# Patient Record
Sex: FEMALE | Race: WHITE | NOT HISPANIC OR LATINO | Employment: FULL TIME | URBAN - METROPOLITAN AREA
[De-identification: names, ages, dates, MRNs, and addresses within clinical notes are randomized per-mention and may not be internally consistent; named-entity substitution may affect disease eponyms.]

---

## 2017-04-26 ENCOUNTER — ALLSCRIPTS OFFICE VISIT (OUTPATIENT)
Dept: OTHER | Facility: OTHER | Age: 47
End: 2017-04-26

## 2017-04-26 DIAGNOSIS — Z12.31 ENCOUNTER FOR SCREENING MAMMOGRAM FOR MALIGNANT NEOPLASM OF BREAST: ICD-10-CM

## 2017-05-03 ENCOUNTER — LAB CONVERSION - ENCOUNTER (OUTPATIENT)
Dept: OTHER | Facility: OTHER | Age: 47
End: 2017-05-03

## 2017-05-03 LAB
ADDITIONAL INFORMATION (HISTORICAL): NORMAL
ADEQUACY: (HISTORICAL): NORMAL
COMMENT (HISTORICAL): NORMAL
CYTOTECHNOLOGIST: (HISTORICAL): NORMAL
HPV MRNA E6/E7 (HISTORICAL): NOT DETECTED
INTERPRETATION (HISTORICAL): NORMAL
LMP (HISTORICAL): NORMAL
PREV. BX: (HISTORICAL): NORMAL
PREV. PAP (HISTORICAL): NORMAL
SOURCE (HISTORICAL): NORMAL

## 2017-05-26 ENCOUNTER — ALLSCRIPTS OFFICE VISIT (OUTPATIENT)
Dept: OTHER | Facility: OTHER | Age: 47
End: 2017-05-26

## 2017-05-30 ENCOUNTER — GENERIC CONVERSION - ENCOUNTER (OUTPATIENT)
Dept: OTHER | Facility: OTHER | Age: 47
End: 2017-05-30

## 2017-06-13 DIAGNOSIS — D64.9 ANEMIA: ICD-10-CM

## 2017-06-28 ENCOUNTER — ALLSCRIPTS OFFICE VISIT (OUTPATIENT)
Dept: OTHER | Facility: OTHER | Age: 47
End: 2017-06-28

## 2017-10-18 ENCOUNTER — GENERIC CONVERSION - ENCOUNTER (OUTPATIENT)
Dept: OTHER | Facility: OTHER | Age: 47
End: 2017-10-18

## 2017-10-18 ENCOUNTER — APPOINTMENT (OUTPATIENT)
Dept: RADIOLOGY | Facility: MEDICAL CENTER | Age: 47
End: 2017-10-18
Payer: COMMERCIAL

## 2017-10-18 DIAGNOSIS — M75.102 TEAR OF LEFT ROTATOR CUFF: ICD-10-CM

## 2017-10-18 DIAGNOSIS — M25.512 PAIN IN LEFT SHOULDER: ICD-10-CM

## 2017-10-18 PROCEDURE — 73030 X-RAY EXAM OF SHOULDER: CPT

## 2017-11-27 ENCOUNTER — ALLSCRIPTS OFFICE VISIT (OUTPATIENT)
Dept: OTHER | Facility: OTHER | Age: 47
End: 2017-11-27

## 2017-11-27 DIAGNOSIS — E61.1 IRON DEFICIENCY: ICD-10-CM

## 2017-11-27 DIAGNOSIS — K91.2 POSTSURGICAL MALABSORPTION, NOT ELSEWHERE CLASSIFIED (CODE): ICD-10-CM

## 2017-11-27 DIAGNOSIS — M25.512 PAIN IN LEFT SHOULDER: ICD-10-CM

## 2017-11-27 DIAGNOSIS — D64.9 ANEMIA: ICD-10-CM

## 2017-11-27 DIAGNOSIS — E53.8 DEFICIENCY OF OTHER SPECIFIED B GROUP VITAMINS (CODE): ICD-10-CM

## 2017-11-29 ENCOUNTER — GENERIC CONVERSION - ENCOUNTER (OUTPATIENT)
Dept: OTHER | Facility: OTHER | Age: 47
End: 2017-11-29

## 2017-11-29 ENCOUNTER — TRANSCRIBE ORDERS (OUTPATIENT)
Dept: ADMINISTRATIVE | Facility: HOSPITAL | Age: 47
End: 2017-11-29

## 2017-11-29 ENCOUNTER — APPOINTMENT (OUTPATIENT)
Dept: LAB | Facility: MEDICAL CENTER | Age: 47
End: 2017-11-29
Payer: COMMERCIAL

## 2017-11-29 DIAGNOSIS — K91.2 POSTSURGICAL MALABSORPTION, NOT ELSEWHERE CLASSIFIED (CODE): ICD-10-CM

## 2017-11-29 DIAGNOSIS — E53.8 DEFICIENCY OF OTHER SPECIFIED B GROUP VITAMINS (CODE): ICD-10-CM

## 2017-11-29 DIAGNOSIS — E61.1 IRON DEFICIENCY: ICD-10-CM

## 2017-11-29 DIAGNOSIS — D64.9 ANEMIA: ICD-10-CM

## 2017-11-29 DIAGNOSIS — M25.512 LEFT SHOULDER PAIN, UNSPECIFIED CHRONICITY: Primary | ICD-10-CM

## 2017-11-29 LAB
ALBUMIN SERPL BCP-MCNC: 3.6 G/DL (ref 3.5–5)
ALP SERPL-CCNC: 41 U/L (ref 46–116)
ALT SERPL W P-5'-P-CCNC: 19 U/L (ref 12–78)
ANION GAP SERPL CALCULATED.3IONS-SCNC: 5 MMOL/L (ref 4–13)
AST SERPL W P-5'-P-CCNC: 16 U/L (ref 5–45)
BILIRUB SERPL-MCNC: 0.51 MG/DL (ref 0.2–1)
BUN SERPL-MCNC: 11 MG/DL (ref 5–25)
CALCIUM SERPL-MCNC: 8.6 MG/DL (ref 8.3–10.1)
CHLORIDE SERPL-SCNC: 104 MMOL/L (ref 100–108)
CHOLEST SERPL-MCNC: 161 MG/DL (ref 50–200)
CO2 SERPL-SCNC: 28 MMOL/L (ref 21–32)
CREAT SERPL-MCNC: 0.65 MG/DL (ref 0.6–1.3)
GFR SERPL CREATININE-BSD FRML MDRD: 105 ML/MIN/1.73SQ M
GLUCOSE P FAST SERPL-MCNC: 77 MG/DL (ref 65–99)
HDLC SERPL-MCNC: 70 MG/DL (ref 40–60)
LDLC SERPL CALC-MCNC: 81 MG/DL (ref 0–100)
POTASSIUM SERPL-SCNC: 4.3 MMOL/L (ref 3.5–5.3)
PROT SERPL-MCNC: 8 G/DL (ref 6.4–8.2)
SODIUM SERPL-SCNC: 137 MMOL/L (ref 136–145)
TRIGL SERPL-MCNC: 51 MG/DL
TSH SERPL DL<=0.05 MIU/L-ACNC: 2.19 UIU/ML (ref 0.36–3.74)
VIT B12 SERPL-MCNC: 94 PG/ML (ref 100–900)

## 2017-11-29 PROCEDURE — 80053 COMPREHEN METABOLIC PANEL: CPT

## 2017-11-29 PROCEDURE — 85045 AUTOMATED RETICULOCYTE COUNT: CPT

## 2017-11-29 PROCEDURE — 36415 COLL VENOUS BLD VENIPUNCTURE: CPT

## 2017-11-29 PROCEDURE — 80061 LIPID PANEL: CPT

## 2017-11-29 PROCEDURE — 83540 ASSAY OF IRON: CPT

## 2017-11-29 PROCEDURE — 84443 ASSAY THYROID STIM HORMONE: CPT

## 2017-11-29 PROCEDURE — 82607 VITAMIN B-12: CPT

## 2017-11-29 PROCEDURE — 85025 COMPLETE CBC W/AUTO DIFF WBC: CPT

## 2017-12-04 ENCOUNTER — GENERIC CONVERSION - ENCOUNTER (OUTPATIENT)
Dept: OTHER | Facility: OTHER | Age: 47
End: 2017-12-04

## 2017-12-10 ENCOUNTER — HOSPITAL ENCOUNTER (EMERGENCY)
Facility: HOSPITAL | Age: 47
Discharge: HOME | End: 2017-12-10
Attending: EMERGENCY MEDICINE | Admitting: EMERGENCY MEDICINE
Payer: COMMERCIAL

## 2017-12-10 VITALS
TEMPERATURE: 97.8 F | WEIGHT: 164.68 LBS | SYSTOLIC BLOOD PRESSURE: 102 MMHG | RESPIRATION RATE: 16 BRPM | HEART RATE: 76 BPM | BODY MASS INDEX: 23.97 KG/M2 | OXYGEN SATURATION: 99 % | DIASTOLIC BLOOD PRESSURE: 63 MMHG

## 2017-12-10 DIAGNOSIS — K52.9 GASTROENTERITIS: Primary | ICD-10-CM

## 2017-12-10 LAB
ALBUMIN SERPL BCP-MCNC: 3.4 G/DL (ref 3.5–5)
ALP SERPL-CCNC: 52 U/L (ref 46–116)
ALT SERPL W P-5'-P-CCNC: 29 U/L (ref 12–78)
ANION GAP SERPL CALCULATED.3IONS-SCNC: 8 MMOL/L (ref 4–13)
AST SERPL W P-5'-P-CCNC: 30 U/L (ref 5–45)
B-HCG SERPL-ACNC: <2 MIU/ML
BASOPHILS # BLD AUTO: 0.04 THOUSANDS/ΜL (ref 0–0.1)
BASOPHILS NFR BLD AUTO: 1 % (ref 0–1)
BILIRUB SERPL-MCNC: 0.3 MG/DL (ref 0.2–1)
BUN SERPL-MCNC: 9 MG/DL (ref 5–25)
CALCIUM SERPL-MCNC: 8.8 MG/DL (ref 8.3–10.1)
CHLORIDE SERPL-SCNC: 105 MMOL/L (ref 100–108)
CO2 SERPL-SCNC: 30 MMOL/L (ref 21–32)
CREAT SERPL-MCNC: 0.75 MG/DL (ref 0.6–1.3)
EOSINOPHIL # BLD AUTO: 0.12 THOUSAND/ΜL (ref 0–0.61)
EOSINOPHIL NFR BLD AUTO: 2 % (ref 0–6)
ERYTHROCYTE [DISTWIDTH] IN BLOOD BY AUTOMATED COUNT: 20 % (ref 11.6–15.1)
GFR SERPL CREATININE-BSD FRML MDRD: 94 ML/MIN/1.73SQ M
GLUCOSE SERPL-MCNC: 86 MG/DL (ref 65–140)
HCT VFR BLD AUTO: 32.5 % (ref 34.8–46.1)
HGB BLD-MCNC: 9.6 G/DL (ref 11.5–15.4)
LACTATE SERPL-SCNC: 1.1 MMOL/L (ref 0.5–2)
LYMPHOCYTES # BLD AUTO: 1.23 THOUSANDS/ΜL (ref 0.6–4.47)
LYMPHOCYTES NFR BLD AUTO: 19 % (ref 14–44)
MCH RBC QN AUTO: 17.7 PG (ref 26.8–34.3)
MCHC RBC AUTO-ENTMCNC: 29.5 G/DL (ref 31.4–37.4)
MCV RBC AUTO: 60 FL (ref 82–98)
MONOCYTES # BLD AUTO: 0.73 THOUSAND/ΜL (ref 0.17–1.22)
MONOCYTES NFR BLD AUTO: 11 % (ref 4–12)
NEUTROPHILS # BLD AUTO: 4.28 THOUSANDS/ΜL (ref 1.85–7.62)
NEUTS SEG NFR BLD AUTO: 67 % (ref 43–75)
PLATELET # BLD AUTO: 556 THOUSANDS/UL (ref 149–390)
PMV BLD AUTO: 9.3 FL (ref 8.9–12.7)
POTASSIUM SERPL-SCNC: 4 MMOL/L (ref 3.5–5.3)
PROT SERPL-MCNC: 7.9 G/DL (ref 6.4–8.2)
RBC # BLD AUTO: 5.43 MILLION/UL (ref 3.81–5.12)
SODIUM SERPL-SCNC: 143 MMOL/L (ref 136–145)
WBC # BLD AUTO: 6.4 THOUSAND/UL (ref 4.31–10.16)

## 2017-12-10 PROCEDURE — 85025 COMPLETE CBC W/AUTO DIFF WBC: CPT | Performed by: EMERGENCY MEDICINE

## 2017-12-10 PROCEDURE — 80053 COMPREHEN METABOLIC PANEL: CPT | Performed by: EMERGENCY MEDICINE

## 2017-12-10 PROCEDURE — 36415 COLL VENOUS BLD VENIPUNCTURE: CPT | Performed by: EMERGENCY MEDICINE

## 2017-12-10 PROCEDURE — 96360 HYDRATION IV INFUSION INIT: CPT

## 2017-12-10 PROCEDURE — 84702 CHORIONIC GONADOTROPIN TEST: CPT | Performed by: EMERGENCY MEDICINE

## 2017-12-10 PROCEDURE — 99284 EMERGENCY DEPT VISIT MOD MDM: CPT

## 2017-12-10 PROCEDURE — 83605 ASSAY OF LACTIC ACID: CPT | Performed by: EMERGENCY MEDICINE

## 2017-12-10 RX ORDER — ONDANSETRON 4 MG/1
4 TABLET, FILM COATED ORAL EVERY 8 HOURS PRN
Qty: 15 TABLET | Refills: 0 | Status: SHIPPED | OUTPATIENT
Start: 2017-12-10 | End: 2018-04-10 | Stop reason: ALTCHOICE

## 2017-12-10 RX ORDER — ONDANSETRON 2 MG/ML
4 INJECTION INTRAMUSCULAR; INTRAVENOUS ONCE
Status: DISCONTINUED | OUTPATIENT
Start: 2017-12-10 | End: 2017-12-10 | Stop reason: HOSPADM

## 2017-12-10 RX ADMIN — SODIUM CHLORIDE 1000 ML: 0.9 INJECTION, SOLUTION INTRAVENOUS at 20:00

## 2017-12-11 NOTE — ED NOTES
Patient given ice chips, tolerating well at this time without complication       Rubin Hackett, RN  12/10/17 6066

## 2017-12-11 NOTE — DISCHARGE INSTRUCTIONS
Gastroenteritis   WHAT YOU NEED TO KNOW:   Gastroenteritis, or stomach flu, is an infection of the stomach and intestines  DISCHARGE INSTRUCTIONS:   Call 911 for any of the following:   · You have trouble breathing or a very fast pulse  Return to the emergency department if:   · You see blood in your diarrhea  · You cannot stop vomiting  · You have not urinated for 12 hours  · You feel like you are going to faint  Contact your healthcare provider if:   · You have a fever  · You continue to vomit or have diarrhea, even after treatment  · You see worms in your diarrhea  · Your mouth or eyes are dry  You are not urinating as much or as often  · You have questions or concerns about your condition or care  Medicines:   · Medicines  may be given to stop vomiting or diarrhea, decrease abdominal cramps, or treat an infection  · Take your medicine as directed  Contact your healthcare provider if you think your medicine is not helping or if you have side effects  Tell him or her if you are allergic to any medicine  Keep a list of the medicines, vitamins, and herbs you take  Include the amounts, and when and why you take them  Bring the list or the pill bottles to follow-up visits  Carry your medicine list with you in case of an emergency  Manage your symptoms:   · Drink liquids as directed  Ask your healthcare provider how much liquid to drink each day, and which liquids are best for you  You may also need to drink an oral rehydration solution (ORS)  An ORS has the right amounts of sugar, salt, and minerals in water to replace body fluids  · Eat bland foods  When you feel hungry, begin eating soft, bland foods  Examples are bananas, clear soup, potatoes, and applesauce  Do not have dairy products, alcohol, sugary drinks, or drinks with caffeine until you feel better  · Rest as much as possible  Slowly start to do more each day when you begin to feel better    Prevent the spread of gastroenteritis:  Gastroenteritis can spread easily  Keep yourself, your family, and your surroundings clean to help prevent the spread of gastroenteritis:  · Wash your hands often  Use soap and water  Wash your hands after you use the bathroom, change a child's diapers, or sneeze  Wash your hands before you prepare or eat food  · Clean surfaces and do laundry often  Wash your clothes and towels separately from the rest of the laundry  Clean surfaces in your home with antibacterial  or bleach  · Clean food thoroughly and cook safely  Wash raw vegetables before you cook  Cook meat, fish, and eggs fully  Do not use the same dishes for raw meat as you do for other foods  Refrigerate any leftover food immediately  · Be aware when you camp or travel  Drink only clean water  Do not drink from rivers or lakes unless you purify or boil the water first  When you travel, drink bottled water and do not add ice  Do not eat fruit that has not been peeled  Do not eat raw fish or meat that is not fully cooked  Follow up with your healthcare provider as directed:  Write down your questions so you remember to ask them during your visits  © 2017 [employer] Information is for End User's use only and may not be sold, redistributed or otherwise used for commercial purposes  All illustrations and images included in CareNotes® are the copyrighted property of [employer]  or [employer]  The above information is an  only  It is not intended as medical advice for individual conditions or treatments  Talk to your doctor, nurse or pharmacist before following any medical regimen to see if it is safe and effective for you

## 2017-12-11 NOTE — ED PROVIDER NOTES
History  Chief Complaint   Patient presents with    Diarrhea     pt reports diarrhea and dry heaves with chills since thurs  denies any fevers or abd pain  Patient is a 50year old female with nausea, vomiting and diarrhea since this past Thursday  (+) chills  No fever  No abdominal pain  No urinary sx  No GI bleeding  Has had prior gastric bypass and  in the past  LMP - 1 week ago  No recent travel  No ill contacts  No raw meat, eggs, fish or recent abx use  Was last seen in this ED on 10/2/16 for neck pain  [facility] website checked on this patient and patient not found  Has a h/o anemia  History provided by:  Patient and relative (son)   used: No    Diarrhea   Associated symptoms: abdominal pain, chills and vomiting    Associated symptoms: no fever        None       Past Medical History:   Diagnosis Date    Anemia     B12 deficiency     Shoulder bursitis        Past Surgical History:   Procedure Laterality Date     SECTION      GASTRIC BYPASS         History reviewed  No pertinent family history  I have reviewed and agree with the history as documented  Social History   Substance Use Topics    Smoking status: Never Smoker    Smokeless tobacco: Never Used    Alcohol use Yes      Comment: rarely        Review of Systems   Constitutional: Positive for chills  Negative for fever  Gastrointestinal: Positive for abdominal pain, diarrhea, nausea and vomiting  Negative for blood in stool  Genitourinary: Negative for difficulty urinating  All other systems reviewed and are negative        Physical Exam  ED Triage Vitals   Temperature Pulse Respirations Blood Pressure SpO2   12/10/17 1832 12/10/17 1832 12/10/17 1832 12/10/17 1832 12/10/17 1832   97 8 °F (36 6 °C) 102 16 126/71 98 %      Temp Source Heart Rate Source Patient Position - Orthostatic VS BP Location FiO2 (%)   12/10/17 1832 12/10/17 1832 12/10/17 2006 12/10/17 2006 --   Oral Monitor Sitting Right arm       Pain Score       12/10/17 1832       No Pain           Orthostatic Vital Signs  Vitals:    12/10/17 1832 12/10/17 2006 12/10/17 2045   BP: 126/71 109/60 104/65   Pulse: 102 83 74   Patient Position - Orthostatic VS:  Sitting Sitting       Physical Exam   Constitutional: She is oriented to person, place, and time  She appears well-developed and well-nourished  She appears distressed (moderate)  HENT:   Head: Normocephalic and atraumatic  Mucous membranes somewhat moist     Eyes: No scleral icterus  Somewhat pale conjunctiva  Neck: Normal range of motion  Neck supple  Cardiovascular: Regular rhythm and normal heart sounds  No murmur heard  Tachycardia  Pulmonary/Chest: Effort normal and breath sounds normal  No stridor  No respiratory distress  Abdominal: Soft  Bowel sounds are normal  She exhibits no distension  There is no tenderness  There is no rebound and no guarding  Musculoskeletal: She exhibits no edema or deformity  Neurological: She is alert and oriented to person, place, and time  Skin: Skin is warm and dry  No rash noted  There is pallor (somewhat)  Psychiatric: She has a normal mood and affect  Nursing note and vitals reviewed        ED Medications  Medications   ondansetron (ZOFRAN) injection 4 mg (4 mg Intravenous Not Given 12/10/17 2013)   sodium chloride 0 9 % bolus 1,000 mL (0 mL Intravenous Stopped 12/10/17 2100)       Diagnostic Studies  Results Reviewed     Procedure Component Value Units Date/Time    Quantitative hCG [[id number]]  (Normal) Collected:  12/10/17 2020    Lab Status:  Final result Specimen:  Blood from Arm, Left Updated:  12/10/17 2108     HCG, Quant <2 mIU/mL     Narrative:          Expected Ranges:     Approximate               Approximate HCG  Gestation age          Concentration ( mIU/mL)  _____________          ______________________   Cephus Dykes                      HCG values  0 2-1                       5-50  1-2   2-3                         100-5000  3-4                         500-67222  4-5                         1000-43140  5-6                         [id number]  6-8                         63229-550611  8-12                        97142-564334    Lactic acid, plasma [[id number]]  (Normal) Collected:  12/10/17 2020    Lab Status:  Final result Specimen:  Blood from Arm, Left Updated:  12/10/17 2053     LACTIC ACID 1 1 mmol/L     Narrative:         Result may be elevated if tourniquet was used during collection  Comprehensive metabolic panel [[id number]]  (Abnormal) Collected:  12/10/17 2020    Lab Status:  Final result Specimen:  Blood from Arm, Left Updated:  12/10/17 2050     Sodium 143 mmol/L      Potassium 4 0 mmol/L      Chloride 105 mmol/L      CO2 30 mmol/L      Anion Gap 8 mmol/L      BUN 9 mg/dL      Creatinine 0 75 mg/dL      Glucose 86 mg/dL      Calcium 8 8 mg/dL      AST 30 U/L      ALT 29 U/L      Alkaline Phosphatase 52 U/L      Total Protein 7 9 g/dL      Albumin 3 4 (L) g/dL      Total Bilirubin 0 30 mg/dL      eGFR 94 ml/min/1 73sq m     Narrative:         National Kidney Disease Education Program recommendations are as follows:  GFR calculation is accurate only with a steady state creatinine  Chronic Kidney disease less than 60 ml/min/1 73 sq  meters  Kidney failure less than 15 ml/min/1 73 sq  meters      CBC and differential [[id number]]  (Abnormal) Collected:  12/10/17 2020    Lab Status:  Final result Specimen:  Blood from Arm, Left Updated:  12/10/17 2031     WBC 6 40 Thousand/uL      RBC 5 43 (H) Million/uL      Hemoglobin 9 6 (L) g/dL      Hematocrit 32 5 (L) %      MCV 60 (L) fL      MCH 17 7 (L) pg      MCHC 29 5 (L) g/dL      RDW 20 0 (H) %      MPV 9 3 fL      Platelets 910 (H) Thousands/uL      Neutrophils Relative 67 %      Lymphocytes Relative 19 %      Monocytes Relative 11 %      Eosinophils Relative 2 %      Basophils Relative 1 %      Neutrophils Absolute 4 28 Thousands/µL Lymphocytes Absolute 1 23 Thousands/µL      Monocytes Absolute 0 73 Thousand/µL      Eosinophils Absolute 0 12 Thousand/µL      Basophils Absolute 0 04 Thousands/µL                  No orders to display              Procedures  Procedures       Phone Contacts  ED Phone Contact    ED Course  ED Course as of Dec 10 2140   Micki Dodge Dec 10, 2017   2138 Patient resting comfortably prior to discharge  MDM  Number of Diagnoses or Management Options  Diagnosis management comments: DDx including but not limited to: gastroenteritis, food poisoning, mesenteric adenitis, doubt appendicitis, IBD, IBS, ileus, doubt bowel obstruction or internal hernia, colitis, enteritis, gastritis, PUD, GERD, hepatitis; doubt pancreatitis, cholecystitis, biliary colic, renal colic, pyelonephritis, UTI  Amount and/or Complexity of Data Reviewed  Clinical lab tests: ordered and reviewed  Decide to obtain previous medical records or to obtain history from someone other than the patient: yes  Review and summarize past medical records: yes      CritCare Time    Disposition  Final diagnoses:   Gastroenteritis     Time reflects when diagnosis was documented in both MDM as applicable and the Disposition within this note     Time User Action Codes Description Comment    12/10/2017  9:39 PM Erick Matamoros Add [K52 9] Gastroenteritis       ED Disposition     ED Disposition Condition Comment    Discharge  Butch Mcburney discharge to home/self care  Condition at discharge: Stable        Follow-up Information     Follow up With Specialties Details Why [address], DO Family Medicine Call in 1 day Drink fluids  Return sooner if increased vomiting, diarrhea, pain, fever, lethargy, weakness, dizziness    [address] [facility]  [phone]          Patient's Medications   Discharge Prescriptions    ONDANSETRON (ZOFRAN) 4 MG TABLET    Take 1 tablet by mouth every 8 (eight) hours as needed for nausea or vomiting for up to 7 days       Start Date: 12/10/2017End Date: 12/17/2017       Order Dose: 4 mg       Quantity: 15 tablet    Refills: 0     No discharge procedures on file      ED Provider  Electronically Signed by           Griselda Johnson MD  12/10/17 1524

## 2017-12-28 ENCOUNTER — HOSPITAL ENCOUNTER (OUTPATIENT)
Dept: MRI IMAGING | Facility: HOSPITAL | Age: 47
Discharge: HOME | End: 2017-12-28
Attending: FAMILY MEDICINE
Payer: COMMERCIAL

## 2017-12-28 DIAGNOSIS — M25.512 LEFT SHOULDER PAIN, UNSPECIFIED CHRONICITY: ICD-10-CM

## 2017-12-28 DIAGNOSIS — M25.512 PAIN IN LEFT SHOULDER: ICD-10-CM

## 2017-12-28 PROCEDURE — 73221 MRI JOINT UPR EXTREM W/O DYE: CPT

## 2018-01-08 ENCOUNTER — GENERIC CONVERSION - ENCOUNTER (OUTPATIENT)
Dept: OTHER | Facility: OTHER | Age: 48
End: 2018-01-08

## 2018-01-08 DIAGNOSIS — M75.22 BICIPITAL TENDINITIS OF LEFT SHOULDER: ICD-10-CM

## 2018-01-08 DIAGNOSIS — M75.102 TEAR OF LEFT ROTATOR CUFF: ICD-10-CM

## 2018-01-10 NOTE — RESULT NOTES
Verified Results  (1) CBC/PLT/DIFF [id number] 08:56AM [facility]     Test Name Result Flag Reference   WHITE BLOOD CELL COUNT 6 1 Thousand/uL  3 8-10 8   RED BLOOD CELL COUNT 4 46 Million/uL  3 80-5 10   HEMOGLOBIN 13 8 g/dL  11 7-15 5   HEMATOCRIT 43 4 %  35 0-45 0   MCV 97 2 fL  80 0-100 0   MCH 31 0 pg  27 0-33 0   MCHC 31 8 g/dL L 32 0-36 0   RDW 16 2 % H 11 0-15 0   PLATELET COUNT 845 Thousand/uL  140-400   MPV 9 2 fL  7 5-11 5   ABSOLUTE NEUTROPHILS 3983 cells/uL  9432-8042   ABSOLUTE LYMPHOCYTES 1531 cells/uL  850-3900   ABSOLUTE MONOCYTES 451 cells/uL  200-950   ABSOLUTE EOSINOPHILS 85 cells/uL     ABSOLUTE BASOPHILS 49 cells/uL  0-200   NEUTROPHILS 65 3 %     LYMPHOCYTES 25 1 %     MONOCYTES 7 4 %     EOSINOPHILS 1 4 %     BASOPHILS 0 8 %       (1) COMPREHENSIVE METABOLIC PANEL [id number] 62:29GK [facility]     Test Name Result Flag Reference   GLUCOSE 85 mg/dL  65-99   Fasting reference interval   UREA NITROGEN (BUN) 10 mg/dL  7-25   CREATININE 0 71 mg/dL  0 50-1 10   eGFR NON-AFR   AMERICAN 101 mL/min/1 73m2  > OR = 60   eGFR AFRICAN AMERICAN 118 mL/min/1 73m2  > OR = 60   BUN/CREATININE RATIO   2-77   NOT APPLICABLE (calc)   SODIUM 136 mmol/L  135-146   POTASSIUM 4 6 mmol/L  3 5-5 3   CHLORIDE 100 mmol/L     CARBON DIOXIDE 27 mmol/L  19-30   CALCIUM 9 2 mg/dL  8 6-10 2   PROTEIN, TOTAL 6 9 g/dL  6 1-8 1   ALBUMIN 4 2 g/dL  3 6-5 1   GLOBULIN 2 7 g/dL (calc)  1 9-3 7   ALBUMIN/GLOBULIN RATIO 1 6 (calc)  1 0-2 5   BILIRUBIN, TOTAL 0 7 mg/dL  0 2-1 2   ALKALINE PHOSPHATASE 32 U/L L    AST 16 U/L  10-35   ALT 7 U/L  6-29     (1) LIPID PANEL, FASTING [id number] 08:56AM [facility]     Test Name Result Flag Reference   CHOLESTEROL, TOTAL 163 mg/dL  125-200   HDL CHOLESTEROL 79 mg/dL  > OR = 46   TRIGLICERIDES 64 mg/dL  <227   LDL-CHOLESTEROL 71 mg/dL (calc)  <130   Desirable range <100 mg/dL for patients with CHD or  diabetes and <70 mg/dL for diabetic patients with  known heart disease  CHOL/HDLC RATIO 2 1 (calc)  < OR = 5 0   NON HDL CHOLESTEROL 84 mg/dL (calc)     Target for non-HDL cholesterol is 30 mg/dL higher than   LDL cholesterol target       (1) VITAMIN B12 [id number] 08:56AM Michael Grout     Test Name Result Flag Reference   VITAMIN B12 1309 pg/mL H 200-1100     (1) FOLATE [id number] 08:56AM Michael Grout     Test Name Result Flag Reference   FOLATE, SERUM 11 3 ng/mL     Reference Range                             Low:           <3 4                             Borderline:    3 4-5 4                             Normal:        >5 4     (1) VITAMIN B6 [id number] 08:56AM Michael Grout     Test Name Result Flag Reference   VITAMIN B6 24 2 ng/mL H 2 1-21 7     (1) IRON [id number] 08:56AM Michael Grout     Test Name Result Flag Reference   IRON, TOTAL 31 mcg/dL L      (1) VITAMIN B1 [id number] 08:56AM [facility]     Test Name Result Flag Reference   VITAMIN B1 (THIAMINE),$BLOOD 134 nmol/L       (Q) METHYLMALONIC ACID [id number] 08:56AM Michael Grout     Test Name Result Flag Reference   METHYLMALONIC ACID 131 nmol/L       (Q) TSH, 3RD GENERATION [id number] 08:56AM Michael Grout     Test Name Result Flag Reference   TSH 2 62 mIU/L     Reference Range                         > or = 20 Years  0 40-4 50                              Pregnancy Ranges            First trimester    0 26-2 66            Second trimester   0 55-2 73            Third trimester    0 43-2 91     *(Q) VITAMIN D, 25-HYDROXY, LC/MS/MS [id number] 08:56AM Hank Baumann   REPORT COMMENT:  FASTING:YES     Test Name Result Flag Reference   VITAMIN D, 25-OH, TOTAL 14 ng/mL L    Vitamin D Status         25-OH Vitamin D:     Deficiency:                    <20 ng/mL  Insufficiency:             20 - 29 ng/mL  Optimal:                 > or = 30 ng/mL     For 25-OH Vitamin D testing on patients on   D2-supplementation and patients for whom quantitation   of D2 and D3 fractions is required, the QuestAssureD(TM)  25-OH VIT D, (D2,D3), LC/MS/MS is recommended: order   code 57204 (patients >2yrs)  For more information on this test, go to:  http://education  Userstorylab/faq/TVT851  (This link is being provided for   informational/educational purposes only )       Discussion/Summary   please decrease vit b complex ( if you are taking it) and  add vitamin d 4000 iu daily;  rest o f labs look good

## 2018-01-12 DIAGNOSIS — E53.8 DEFICIENCY OF OTHER SPECIFIED B GROUP VITAMINS (CODE): ICD-10-CM

## 2018-01-12 DIAGNOSIS — E61.1 IRON DEFICIENCY: ICD-10-CM

## 2018-01-12 DIAGNOSIS — K91.2 POSTSURGICAL MALABSORPTION, NOT ELSEWHERE CLASSIFIED (CODE): ICD-10-CM

## 2018-01-12 DIAGNOSIS — D64.9 ANEMIA: ICD-10-CM

## 2018-01-13 VITALS
SYSTOLIC BLOOD PRESSURE: 108 MMHG | WEIGHT: 169.38 LBS | DIASTOLIC BLOOD PRESSURE: 74 MMHG | HEIGHT: 68 IN | BODY MASS INDEX: 25.67 KG/M2

## 2018-01-13 VITALS
DIASTOLIC BLOOD PRESSURE: 60 MMHG | WEIGHT: 168.25 LBS | BODY MASS INDEX: 25.5 KG/M2 | SYSTOLIC BLOOD PRESSURE: 102 MMHG | HEIGHT: 68 IN

## 2018-01-13 NOTE — PROGRESS NOTES
Assessment    1  Wellness examination (V70 0) (Z00 00)   2  Encounter for preventive health examination (V70 0) (Z00 00)   3  Anemia (285 9) (D64 9)   4  Iron deficiency (280 9) (E61 1)    Plan  Anemia, Iron deficiency    · (1) TSH; Status:Active; Requested for:27Nov2017;   Iron deficiency    · (1) CBC/PLT/DIFF; Status:Active; Requested for:27Nov2017;    · (1) COMPREHENSIVE METABOLIC PANEL; Status:Active; Requested for:27Nov2017;    · (1) IRON; Status:Active; Requested for:27Nov2017;   Iron deficiency, Postgastrectomy malabsorption    · (1) LIPID PANEL, FASTING; Status:Active; Requested for:27Nov2017;   Iron deficiency, Vitamin B12 deficiency    · (1) VITAMIN B12; Status:Active; Requested for:27Nov2017;     Discussion/Summary  health maintenance visit healthy adult female Currently, she eats a healthy diet  cervical cancer screening is current SEES DR HERNANDEZ Breast cancer screening: mammogram is current and mammogram has been ordered  Colorectal cancer screening: colorectal cancer screening is not indicated  Osteoporosis screening: bone mineral density testing is not indicated  Screening lab work includes hemoglobin and glucose  The immunizations are up to date  She was advised to be evaluated by an ophthalmologist  Advice and education were given regarding nutrition, aerobic exercise and vitamin D supplements  Patient discussion: discussed with the patient  WELLNESS- IMM UIP TO [facility]  MAMMO AND PAP UP TOD ATE  OBTAIN LABS  FOLLOW UP WITH ORTHO- FOR LEFT SHOULDER PAIN-0 AVOID NSAIDS DUE TO GASTRIC BYPASS  The patient was counseled regarding diagnostic results, instructions for management, risk factor reductions, prognosis, patient and family education, impressions, risks and benefits of treatment options, importance of compliance with treatment  Chief Complaint  PATIENT HERE FOR ANNUAL EXAM; HER PAP AND MAMMO AR UP TO DATE; History of Present Illness  HM, Adult Female:  The patient is being seen for a health maintenance evaluation  The last health maintenance visit was 12 month(s) ago  General Health: The patient's health since the last visit is described as good  She has regular dental visits  She denies vision problems  She denies hearing loss  Immunizations status: up to date  Lifestyle:  She consumes a diverse and healthy diet  She exercises regularly  She does not use tobacco  She denies alcohol use  She denies drug use  Reproductive health: the patient is premenopausal   she reports normal menses  Screening: cancer screening reviewed and current  metabolic screening reviewed and current  risk screening reviewed and current  HPI: PATIENT HERE FOR ANNUAL EXAM  SHE HAS SEEN DENTIST, EYE DOCTOR; Review of Systems    Constitutional: feeling tired, but No fever, no chills, feels well, no tiredness, no recent weight gain or weight loss, as noted in HPI and not feeling poorly  Eyes: No complaints of eye pain, no red eyes, no eyesight problems, no discharge, no dry eyes, no itching of eyes, no eye pain and eyes not red  ENT: no complaints of earache, no loss of hearing, no nose bleeds, no nasal discharge, no sore throat, no hoarseness, no earache and no hearing loss  Cardiovascular: No complaints of slow heart rate, no fast heart rate, no chest pain, no palpitations, no leg claudication, no lower extremity edema, no chest pain and no palpitations  Respiratory: No complaints of shortness of breath, no wheezing, no cough, no SOB on exertion, no orthopnea, no PND  Gastrointestinal: No complaints of abdominal pain, no constipation, no nausea or vomiting, no diarrhea, no bloody stools and no nausea  Genitourinary: No complaints of dysuria, no incontinence, no pelvic pain, no dysmenorrhea, no vaginal discharge or bleeding, no pelvic pain and no dysmenorrhea     Musculoskeletal: No complaints of arthralgias, no myalgias, no joint swelling or stiffness, no limb pain or swelling, no joint swelling and no joint stiffness  Integumentary: No complaints of skin rash or lesions, no itching, no skin wounds, no breast pain or lump, no rashes, no skin lesions and no skin wound  Neurological: No complaints of headache, no confusion, no convulsions, no numbness, no dizziness or fainting, no tingling, no limb weakness, no difficulty walking, no headache, no numbness, no confusion, no dizziness and no fainting  Psychiatric: Not suicidal, no sleep disturbance, no anxiety or depression, no change in personality, no emotional problems and not suicidal    Endocrine: No complaints of proptosis, no hot flashes, no muscle weakness, no deepening of the voice, no feelings of weakness  Hematologic/Lymphatic: No complaints of swollen glands, no swollen glands in the neck, does not bleed easily, does not bruise easily, no swollen glands, no tendency for easy bleeding, no tendency for easy bruising and no swollen glands in the neck  ROS reviewed  Active Problems    1  Anemia (285 9) (D64 9)   2  Encounter for gynecological examination (V72 31) (Z01 419)   3  Encounter for insertion of mirena IUD (V25 11) (Z30 430)   4  Encounter for screening mammogram for breast cancer (V76 12) (Z12 31)   5  Iron deficiency (280 9) (E61 1)   6  IUD (intrauterine device) in place (V45 51) (Z97 5)   7  Need for prophylactic vaccination and inoculation against influenza (V04 81) (Z23)   8  Pap smear for cervical cancer screening (V76 2) (Z12 4)   9  Postgastrectomy malabsorption (579 3) (K91 2,Z90 3)   10  Rotator cuff syndrome of left shoulder (726 10) (M75 102)   11  Shoulder pain, left (719 41) (M25 512)   12  Vitamin B12 deficiency (266 2) (E53 8)   13   Wellness examination (V70 0) (Z00 00)    Past Medical History    · History of Gastric bypass status for obesity (V45 86) (Z98 84)    Surgical History    · History of  Section   · History of Gastric Surgery For Morbid Obesity   · History of Gastric Surgery For Morbid Obesity Bypass With Jonathon-en-Y   · History of Gastric Surgery For Morbid Obesity Gastric Bypass   · History of Hemorrhoidectomy    Family History  Mother    · Family history of lung cancer (V16 1) (Z80 1)  Father    · Family history of    · Family history of pancreatic cancer (V16 0) (Z80 0)    Social History    · Denied: History of Alcohol use   · IUD (intrauterine device) in place (V45 51) (Z97 5)   · Never a smoker    Current Meds   1  Cyanocobalamin 1000 MCG/ML Injection Solution; INJECT 1  ML Intramuscular   Recorded   2  Physicians EZ Use B-12 1000 MCG/ML Injection Kit; B12 1 ML MONTHLY; Therapy: [id number] to (Evaluate:2017)  Requested for: 2016; Last   Rx:2016 Ordered    Allergies    1  No Known Drug Allergies    Vitals   Recorded: [id number] 07:57AM   Temperature 97 6 F   Heart Rate 80   Respiration 16   Systolic 092   Diastolic 70   Height 5 ft 8 in   Weight 165 lb 12 8 oz   BMI Calculated 25 21   BSA Calculated 1 89     Physical Exam    Constitutional   General appearance: No acute distress, well appearing and well nourished  WDWN FEMALE IN NAD  Eyes   Conjunctiva and lids: No swelling, erythema or discharge  Pupils and irises: Equal, round, reactive to light  Ears, Nose, Mouth, and Throat   External inspection of ears and nose: Normal     Otoscopic examination: Tympanic membranes translucent with normal light reflex  Canals patent without erythema  Hearing: Normal     Nasal mucosa, septum, and turbinates: Normal without edema or erythema  Lips, teeth, and gums: Normal, good dentition  Oropharynx: Normal with no erythema, edema, exudate or lesions  CLEAR  Neck   Neck: Supple, symmetric, trachea midline, no masses  Thyroid: Normal, no thyromegaly  Pulmonary   Respiratory effort: No increased work of breathing or signs of respiratory distress  Percussion of chest: Normal     Palpation of chest: Normal     Auscultation of lungs: Clear to auscultation  Cardiovascular   Palpation of heart: Normal PMI, no thrills  The PMI was palpated at the 5th LICS in the midclavicular line  The apical impulse was normal  no precordial heave was noted  Auscultation of heart: Normal rate and rhythm, normal S1 and S2, no murmurs  The heart rate was normal  The rhythm was regular  Heart sounds: normal S1, normal S2, no S3 and no S4  no murmurs were heard  Carotid pulses: 2+ bilaterally  NO BRUITS  Femoral pulses: 2+ bilaterally  Pedal pulses: 2+ bilaterally  Examination of extremities for edema and/or varicosities: Normal     Abdomen   Abdomen: Non-tender, no masses  Liver and spleen: No hepatomegaly or splenomegaly  Lymphatic   Palpation of lymph nodes in neck: No lymphadenopathy  Musculoskeletal   Gait and station: Normal     Digits and nails: Normal without clubbing or cyanosis  Joints, bones, and muscles: Normal     Range of motion: Abnormal   PAN LEFT SHOULDER AT [facility] JOINT; POS EMPTY CAN SIGN;  Stability: Normal     Muscle strength/tone: Normal     Skin   Skin and subcutaneous tissue: Normal without rashes or lesions  Palpation of skin and subcutaneous tissue: Normal turgor  Neurologic   Cranial nerves: Cranial nerves II-XII intact  Reflexes: 2+ and symmetric  Sensation: No sensory loss  Psychiatric   Judgment and insight: Normal     Orientation to person, place, and time: Normal     Recent and remote memory: Intact  Mood and affect: Normal        Future Appointments    Date/Time Provider Specialty Site   11/29/2017 08:20 AM Devin Valadez DO [facility] [address]     Signatures   Electronically signed by :  Lee Ann Carrillo DO; Nov 27 2017  8:16AM EST                       (Author)

## 2018-01-14 VITALS
RESPIRATION RATE: 16 BRPM | TEMPERATURE: 97.6 F | HEIGHT: 68 IN | WEIGHT: 165.8 LBS | HEART RATE: 80 BPM | BODY MASS INDEX: 25.13 KG/M2 | SYSTOLIC BLOOD PRESSURE: 100 MMHG | DIASTOLIC BLOOD PRESSURE: 70 MMHG

## 2018-01-14 VITALS
DIASTOLIC BLOOD PRESSURE: 64 MMHG | SYSTOLIC BLOOD PRESSURE: 106 MMHG | HEIGHT: 68 IN | WEIGHT: 172.13 LBS | BODY MASS INDEX: 26.09 KG/M2

## 2018-01-22 VITALS
HEART RATE: 86 BPM | HEIGHT: 68 IN | SYSTOLIC BLOOD PRESSURE: 101 MMHG | DIASTOLIC BLOOD PRESSURE: 67 MMHG | WEIGHT: 172 LBS | BODY MASS INDEX: 26.07 KG/M2

## 2018-01-22 VITALS
DIASTOLIC BLOOD PRESSURE: 69 MMHG | BODY MASS INDEX: 25.01 KG/M2 | HEIGHT: 68 IN | SYSTOLIC BLOOD PRESSURE: 109 MMHG | HEART RATE: 75 BPM | WEIGHT: 165 LBS

## 2018-01-23 NOTE — RESULT NOTES
Discussion/Summary   Blood count shows anemia and b12 deficiency  Verified Results  (1) CBC/PLT/DIFF [id number] 09:02AM Trista Jung   TW Order Number: [id number]     Test Name Result Flag Reference   WBC COUNT 6 44 Thousand/uL  4 31-10 16   RBC COUNT 5 27 Million/uL H 3 81-5 12   HEMOGLOBIN 9 2 g/dL L 11 5-15 4   HEMATOCRIT 31 8 % L 34 8-46  1   MCV 60 fL L 82-98   MCH 17 5 pg L 26 8-34 3   MCHC 28 9 g/dL L 31 4-37 4   RDW 18 9 % H 11 6-15 1   MPV 9 4 fL  8 9-12 7   PLATELET COUNT 830 Thousands/uL H 149-390   nRBC AUTOMATED 0 /100 WBCs     NEUTROPHILS RELATIVE PERCENT 66 %  43-75   LYMPHOCYTES RELATIVE PERCENT 24 %  14-44   MONOCYTES RELATIVE PERCENT 8 %  4-12   EOSINOPHILS RELATIVE PERCENT 1 %  0-6   BASOPHILS RELATIVE PERCENT 1 %  0-1   NEUTROPHILS ABSOLUTE COUNT 4 23 Thousands/? ??L  1 85-7 62   LYMPHOCYTES ABSOLUTE COUNT 1 53 Thousands/? ??L  0 60-4 47   MONOCYTES ABSOLUTE COUNT 0 53 Thousand/? ??L  0 17-1 22   EOSINOPHILS ABSOLUTE COUNT 0 06 Thousand/? ??L  0 00-0 61   BASOPHILS ABSOLUTE COUNT 0 07 Thousands/? ??L  0 00-0 10     (1) IRON [id number] 09:02AM Kelle Pi Order Number: [id number]     Test Name Result Flag Reference   IRON 15 ug/dL L    Patients treated with metal-binding drugs (ie  Deferoxamine) may have depressed iron values       (1) RETICULOCYTE COUNT [id number] 09:02AM Trista Jung    Order Number: [id number]     Test Name Result Flag Reference   RETIC ABS # [id number]  [id number]   RETIC % 0 88 %  0 37-1 87     (1) COMPREHENSIVE METABOLIC PANEL [id number] 43:36II Trista Jung     Test Name Result Flag Reference   SODIUM 137 mmol/L  136-145   POTASSIUM 4 3 mmol/L  3 5-5 3   CHLORIDE 104 mmol/L  100-108   CARBON DIOXIDE 28 mmol/L  21-32   ANION GAP (CALC) 5 mmol/L  4-13   BLOOD UREA NITROGEN 11 mg/dL  5-25   CREATININE 0 65 mg/dL  0 60-1 30   Standardized to IDMS reference method   CALCIUM 8 6 mg/dL  8 3-10 1   BILI, TOTAL 0 51 mg/dL  0 20-1 00   ALK PHOSPHATAS 41 U/L L    ALT (SGPT) 19 U/L  12-78   Specimen collection should occur prior to Sulfasalazine and/or Sulfapyridine administration due to the potential for falsely depressed results  AST(SGOT) 16 U/L  5-45   Specimen collection should occur prior to Sulfasalazine administration due to the potential for falsely depressed results  ALBUMIN 3 6 g/dL  3 5-5 0   TOTAL PROTEIN 8 0 g/dL  6 4-8 2   eGFR 105 ml/min/1 73sq [facility] Disease Education Program recommendations are as follows:  GFR calculation is accurate only with a steady state creatinine  Chronic Kidney disease less than 60 ml/min/1 73 sq  meters  Kidney failure less than 15 ml/min/1 73 sq  meters  GLUCOSE FASTING 77 mg/dL  65-99   Specimen collection should occur prior to Sulfasalazine administration due to the potential for falsely depressed results  Specimen collection should occur prior to Sulfapyridine administration due to the potential for falsely elevated results  (1) LIPID PANEL, FASTING [id number] 09:02AM Alison Luevano     Test Name Result Flag Reference   CHOLESTEROL 161 mg/dL     HDL,DIRECT 70 mg/dL H 40-60   Specimen collection should occur prior to Metamizole administration due to the potential for falsley depressed results  LDL CHOLESTEROL CALCULATED 81 mg/dL  0-100   Triglyceride:        Normal <150 mg/dl   Borderline High 150-199 mg/dl   High 200-499 mg/dl   Very High >499 mg/dl      Cholesterol:       Desirable <200 mg/dl    Borderline High 200-239 mg/dl    High >239 mg/dl      HDL Cholesterol:       High>59 mg/dL    Low <41 mg/dL      This screening LDL is a calculated result  It does not have the accuracy of the Direct Measured LDL in the monitoring of patients with hyperlipidemia and/or statin therapy  Direct Measure LDL (QNG265) must be ordered separately in these patients     TRIGLYCERIDES 51 mg/dL  <=150   Specimen collection should occur prior to N-Acetylcysteine or Metamizole administration due to the potential for falsely depressed results  (1) TSH [id number] 09:02AM Arpita Viridiana     Test Name Result Flag Reference   TSH 2 190 uIU/mL  0 358-3 740   Patients undergoing fluorescein dye angiography may retain small amounts of fluorescein in the body for 48-72 hours post procedure  Samples containing fluorescein can produce falsely depressed TSH values  If the patient had this procedure,a specimen should be resubmitted post fluorescein clearance            The recommended reference ranges for TSH during pregnancy are as follows:  First trimester 0 1 to 2 5 uIU/mL  Second trimester  0 2 to 3 0 uIU/mL  Third trimester 0 3 to 3 0 uIU/m     (1) VITAMIN B12 [id number] 09:02AM Arpita Kemp     Test Name Result Flag Reference   VITAMIN B12 94 pg/mL L 100-900

## 2018-01-24 VITALS
HEART RATE: 76 BPM | DIASTOLIC BLOOD PRESSURE: 63 MMHG | SYSTOLIC BLOOD PRESSURE: 110 MMHG | HEIGHT: 68 IN | WEIGHT: 165 LBS | BODY MASS INDEX: 25.01 KG/M2

## 2018-04-10 ENCOUNTER — ANNUAL EXAM (OUTPATIENT)
Dept: OBGYN CLINIC | Facility: CLINIC | Age: 48
End: 2018-04-10
Payer: COMMERCIAL

## 2018-04-10 VITALS
WEIGHT: 171.2 LBS | HEIGHT: 69 IN | SYSTOLIC BLOOD PRESSURE: 110 MMHG | DIASTOLIC BLOOD PRESSURE: 70 MMHG | BODY MASS INDEX: 25.36 KG/M2

## 2018-04-10 DIAGNOSIS — B37.3 VAGINAL CANDIDA: ICD-10-CM

## 2018-04-10 DIAGNOSIS — Z01.419 WOMEN'S ANNUAL ROUTINE GYNECOLOGICAL EXAMINATION: Primary | ICD-10-CM

## 2018-04-10 DIAGNOSIS — Z12.39 BREAST CANCER SCREENING: ICD-10-CM

## 2018-04-10 PROBLEM — M75.22 BICEPS TENDINITIS OF LEFT UPPER EXTREMITY: Status: ACTIVE | Noted: 2018-01-08

## 2018-04-10 PROBLEM — M25.512 LEFT SHOULDER PAIN: Status: ACTIVE | Noted: 2017-11-29

## 2018-04-10 PROBLEM — M75.102 ROTATOR CUFF SYNDROME OF LEFT SHOULDER: Status: ACTIVE | Noted: 2017-10-18

## 2018-04-10 PROBLEM — M25.512 SHOULDER PAIN, LEFT: Status: ACTIVE | Noted: 2017-10-18

## 2018-04-10 LAB
C TRACH RRNA SPEC QL PROBE: NEGATIVE
N GONORRHOEA RRNA SPEC QL PROBE: NEGATIVE

## 2018-04-10 PROCEDURE — 99396 PREV VISIT EST AGE 40-64: CPT | Performed by: OBSTETRICS & GYNECOLOGY

## 2018-04-10 RX ORDER — FLUCONAZOLE 100 MG/1
100 TABLET ORAL DAILY
Qty: 2 TABLET | Refills: 0 | Status: SHIPPED | OUTPATIENT
Start: 2018-04-10 | End: 2018-04-12

## 2018-04-10 NOTE — PROGRESS NOTES
Subjective  HPI:     Candis Carl is a 50 y o  female  She is a  2 Para 2, with one vaginal delivery and one  section  Her menstrual cycles are regular and predictable  Her current method of contraception includes Mirena IUD and she is happy with this method  She currently in a relationship  She denies issues with intimacy  She denies /GI complaints  She has been experiencing some vaginal itching and irritation for the past 2 weeks  She has been using monistat and vagisil with minimal relief  She denies signs and symptoms of menopause  She denies depression/anxiety  She denies any new family illness or stressors  Her dental care is not done  We discussed the importance of dental health in relation to heart health  Gynecologic History    Patient's last menstrual period was 2018 (exact date)  Last Pap: 17  Results were: normal  Last mammogram:   Results were: normal  She is scheduled for screening in May  Obstetric History    OB History    Para Term  AB Living   2 2       2   SAB TAB Ectopic Multiple Live Births           2      # Outcome Date GA Lbr Tuan/2nd Weight Sex Delivery Anes PTL Lv   2 Para     M Vag-Spont      1 Para     M CS-LTranv             The following portions of the patient's history were reviewed and updated as appropriate: allergies, current medications, past family history, past medical history, past social history, past surgical history and problem list     Review of Systems    Pertinent items are noted in HPI  Objective    Physical Exam   Constitutional: Vital signs are normal  She appears well-developed and well-nourished  Genitourinary: Vagina normal and uterus normal  Pelvic exam was performed with patient supine  There is rash and tenderness on the right labia  There is no lesion or Bartholin's cyst on the right labia  There is rash and tenderness on the left labia  There is no lesion or Bartholin's cyst on the left labia  Right adnexum does not display mass, does not display tenderness and does not display fullness  Left adnexum does not display mass, does not display tenderness and does not display fullness  Cervix is parous  Cervix exhibits visible IUD strings  Cervix does not exhibit motion tenderness, lesion, discharge, friability, polyp or nabothian cyst      Uterus is anteverted  Genitourinary Comments: There is erythema noted between the labial folds with scant white discharge  Sensitive to the touch  HENT:   Head: Normocephalic and atraumatic  Neck: Neck supple  No thyromegaly present  Cardiovascular: Normal rate, regular rhythm, S1 normal, S2 normal and normal heart sounds  Pulmonary/Chest: Effort normal and breath sounds normal  Right breast exhibits no inverted nipple, no mass, no nipple discharge, no skin change and no tenderness  Left breast exhibits no inverted nipple, no mass, no nipple discharge, no skin change and no tenderness  Abdominal: Soft  Normal appearance and bowel sounds are normal  She exhibits no distension and no mass  There is no tenderness  There is no guarding  Lymphadenopathy:     She has no cervical adenopathy  She has no axillary adenopathy  Neurological: She is alert  Skin: Skin is warm  Psychiatric: She has a normal mood and affect  Nursing note and vitals reviewed  Assessment and Plan    Patient informed of a Stable GYN exam  A pap smear was performed and as well as [facility] cultures  A 2 day course of diflucan was prescribed for her external yeast infection given that the OTC treatments have not been effective  The current ASCCP guidelines were reviewed  The low risk patient will receive pap smear screening every 3 years  I emphasized the importance of an annual pelvic and breast exam  A yearly mammogram is recommended for breast cancer screening starting at age 36  I also emphasized the need for routine dental care and the importance it has on heart health   All questions have been answered to her satisfaction  Contraception: IUD  Mammogram ordered  Follow up in: 1 year or sooner if needed  We will call with culture results  Georgette Ormond

## 2018-04-13 ENCOUNTER — OFFICE VISIT (OUTPATIENT)
Dept: OBGYN CLINIC | Facility: CLINIC | Age: 48
End: 2018-04-13
Payer: COMMERCIAL

## 2018-04-13 VITALS
HEIGHT: 69 IN | DIASTOLIC BLOOD PRESSURE: 78 MMHG | SYSTOLIC BLOOD PRESSURE: 112 MMHG | WEIGHT: 170 LBS | BODY MASS INDEX: 25.18 KG/M2

## 2018-04-13 DIAGNOSIS — B37.3 VULVOVAGINITIS CANDIDA ALBICANS: Primary | ICD-10-CM

## 2018-04-13 LAB
CLINICAL INFO: NORMAL
CYTO CVX: NORMAL
CYTOLOGY CMNT CVX/VAG CYTO-IMP: NORMAL
DATE PREVIOUS BX: NORMAL
HPV E6+E7 MRNA CVX QL NAA+PROBE: NOT DETECTED
LMP START DATE: NORMAL
SL AMB PREV. PAP:: NORMAL
SPECIMEN SOURCE CVX/VAG CYTO: NORMAL

## 2018-04-13 PROCEDURE — 99213 OFFICE O/P EST LOW 20 MIN: CPT | Performed by: NURSE PRACTITIONER

## 2018-04-13 NOTE — PROGRESS NOTES
Subjective     Bertrand Meza is a 50 y o  female who presents for evaluation of an vaginal irritations and burning  States it feels like "stabbing glass"  She was seen 3 days ago for her annual exam, at which time I prescribed 2 doses of Diflucan and cultures were sent to [facility]  Symptoms have been present for 2 weeks  Patient has not had any improvement with prescribed oral antifungal and topical OTC cream  Vaginal symptoms: burning, local irritation, pain and vulvar itching  Contraception: IUD  She denies abnormal bleeding, blisters, lesions and odor Sexually transmitted infection risk: very low risk of STD exposure  The following portions of the patient's history were reviewed and updated as appropriate: allergies, current medications, past family history, past medical history, past social history, past surgical history and problem list     Review of Systems  Pertinent items are noted in HPI       Objective     Physical Exam   Constitutional: Vital signs are normal  She appears well-developed and well-nourished  Genitourinary: Pelvic exam was performed with patient supine  There is rash and tenderness on the right labia  There is rash and tenderness on the left labia  Genitourinary Comments: The external genitalia is erythematous and excoriated with a moist white discharge within the labial folds  Neck: Neck supple  Neurological: She is alert  Skin: Skin is warm  Nursing note and vitals reviewed  Assessment/Plan   Michael Tan was seen today for vaginal pain  Diagnoses and all orders for this visit:    Vulvovaginitis candida albicans  -     terconazole (TERAZOL 3) 0 8 % vaginal cream; Insert 1 applicator into the vagina daily at bedtime      [facility] genital culture results were received via fax and it was confirmed that she has Candida Albicans  A prescription for Drew Peace has been issued, this is based on the CDC recommendations   Patient advised to administer the cream transvaginally and externally for 3 days  She is to follow up if symptoms persist or worsen

## 2018-06-07 ENCOUNTER — OFFICE VISIT (OUTPATIENT)
Dept: FAMILY MEDICINE CLINIC | Facility: CLINIC | Age: 48
End: 2018-06-07
Payer: COMMERCIAL

## 2018-06-07 VITALS
WEIGHT: 172 LBS | OXYGEN SATURATION: 99 % | HEART RATE: 88 BPM | TEMPERATURE: 98 F | SYSTOLIC BLOOD PRESSURE: 94 MMHG | DIASTOLIC BLOOD PRESSURE: 58 MMHG | RESPIRATION RATE: 18 BRPM | BODY MASS INDEX: 25.4 KG/M2

## 2018-06-07 DIAGNOSIS — K90.9 INTESTINAL MALABSORPTION, UNSPECIFIED TYPE: ICD-10-CM

## 2018-06-07 DIAGNOSIS — E61.1 IRON DEFICIENCY: ICD-10-CM

## 2018-06-07 DIAGNOSIS — D50.9 IRON DEFICIENCY ANEMIA, UNSPECIFIED IRON DEFICIENCY ANEMIA TYPE: ICD-10-CM

## 2018-06-07 DIAGNOSIS — R06.02 SOB (SHORTNESS OF BREATH): ICD-10-CM

## 2018-06-07 DIAGNOSIS — K91.2 POSTGASTRECTOMY MALABSORPTION: ICD-10-CM

## 2018-06-07 DIAGNOSIS — Z90.3 POSTGASTRECTOMY MALABSORPTION: ICD-10-CM

## 2018-06-07 DIAGNOSIS — E53.8 VITAMIN B12 DEFICIENCY: ICD-10-CM

## 2018-06-07 DIAGNOSIS — R00.2 HEART PALPITATIONS: Primary | ICD-10-CM

## 2018-06-07 DIAGNOSIS — K21.9 CHRONIC GERD: ICD-10-CM

## 2018-06-07 PROCEDURE — 1036F TOBACCO NON-USER: CPT | Performed by: INTERNAL MEDICINE

## 2018-06-07 PROCEDURE — 93000 ELECTROCARDIOGRAM COMPLETE: CPT | Performed by: INTERNAL MEDICINE

## 2018-06-07 PROCEDURE — 99214 OFFICE O/P EST MOD 30 MIN: CPT | Performed by: INTERNAL MEDICINE

## 2018-06-07 RX ORDER — OMEPRAZOLE 40 MG/1
40 CAPSULE, DELAYED RELEASE ORAL DAILY
Qty: 30 CAPSULE | Refills: 2 | Status: SHIPPED | OUTPATIENT
Start: 2018-06-07 | End: 2019-09-11 | Stop reason: SDUPTHER

## 2018-06-07 NOTE — PROGRESS NOTES
ASSESSMENT and PLAN:  Bertrand Meza is a 50 y o  female with:   Problem List Items Addressed This Visit     Anemia    Iron deficiency     Check iron and ferritin         Postgastrectomy malabsorption     Check labs         Vitamin B12 deficiency    Chronic GERD     Start ppi daily  Obtain labs  Follow up  Consider egd due to hx of gastric bypass         Relevant Medications    omeprazole (PriLOSEC) 40 MG capsule    Other Relevant Orders    Comprehensive metabolic panel    CBC and differential    Intestinal malabsorption     Check labs, check iron  Check b12 and cbc         Relevant Orders    CBC and differential    Vitamin B12    Iron    Ferritin    Heart palpitations - Primary     ekg normal   May be due to gerd  Add omeprazole   Follow up in 2 weeks by phone  I fno better vy get pft and consider inhaler   If better follow up in 8 weeks          Relevant Orders    POCT ECG (Completed)    XR chest pa & lateral    TSH, 3rd generation    T4, free    Comprehensive metabolic panel    SOB (shortness of breath)     Check cxr  Consider pft's if ppi does not help and if everything else is normal         Relevant Orders    Comprehensive metabolic panel    CBC and differential          SUBJECTIVE:  Bertrand Meza is a 50 y o  female who presents today with a chief complaint of Rapid Heart Rate (With pain across shoulders) and Shortness of Breath    Patient with sob with exertion over last few weeks; She works in a [employer] and walks a lot-  She has sob , very winded zena with activity and has tightness across chest- gets better with sitting down  She has gerd- she sits up right at night  She has no allergy symtoms  She has occasional rhinorrhea;    Her reflux produces mucous and occ vomiting  She feels sob with reflux  She breaks out in a sweat  Her last attack was 3 weeks ago  She has had gastric bypass 13 years ago - [id number] (dr nicholas)        EKG: normal EKG, normal sinus rhythm, unchanged from previous tracings  Review of Systems   Constitutional: Negative  HENT: Negative  Eyes: Negative  Respiratory: Positive for chest tightness and shortness of breath  Negative for apnea, cough, choking, wheezing and stridor  Cardiovascular: Negative  Gastrointestinal: Negative for abdominal distention, abdominal pain, anal bleeding, blood in stool, constipation, diarrhea, nausea and vomiting  Gerd symotms- frequent- happens once a month  She sleeps sitting up due to reflux    Endocrine: Negative  Genitourinary: Negative  Musculoskeletal: Negative  Skin: Negative  Allergic/Immunologic: Negative  Neurological: Negative  Hematological: Negative  Psychiatric/Behavioral: Negative  I have reviewed the patient's PMH, Social History, Medication List and Allergies  OBJECTIVE:  BP 94/58 (BP Location: Left arm, Patient Position: Sitting, Cuff Size: Adult)   Pulse 88   Temp 98 °F (36 7 °C) (Tympanic)   Resp 18   Wt 78 kg (172 lb)   SpO2 99%   BMI 25 40 kg/m²   Physical Exam   Constitutional: She is oriented to person, place, and time  She appears well-developed and well-nourished  HENT:   Head: Normocephalic and atraumatic  Right Ear: External ear normal    Left Ear: External ear normal    Nose: Nose normal    Mouth/Throat: Oropharynx is clear and moist    Eyes: Conjunctivae and EOM are normal  Pupils are equal, round, and reactive to light  Right eye exhibits no discharge  Left eye exhibits no discharge  Neck: Normal range of motion  Neck supple  No JVD present  No tracheal deviation present  Cardiovascular: Normal rate, regular rhythm, normal heart sounds and intact distal pulses  Pulmonary/Chest: Effort normal and breath sounds normal  No stridor  No respiratory distress  She has no wheezes  She has no rales  Abdominal: Soft  Bowel sounds are normal  She exhibits no distension  There is no tenderness  There is no rebound and no guarding     Musculoskeletal: Normal range of motion  She exhibits no edema or tenderness  Lymphadenopathy:     She has no cervical adenopathy  Neurological: She is alert and oriented to person, place, and time  She has normal reflexes  Skin: Skin is warm and dry  No rash noted  No erythema  Psychiatric: She has a normal mood and affect  Her behavior is normal  Judgment and thought content normal    Nursing note and vitals reviewed

## 2018-06-07 NOTE — PATIENT INSTRUCTIONS
Get chest xray  Get labs  Start omeprazole once daily  Call withu dpate  If no better will stay on it but check pulmonary function tests  Follow up in 8 weeks to determine length of tx

## 2018-06-07 NOTE — ASSESSMENT & PLAN NOTE
ekg normal   May be due to gerd  Add omeprazole   Follow up in 2 weeks by phone  I fno better vy get pft and consider inhaler   If better follow up in 8 weeks

## 2019-05-08 ENCOUNTER — ANNUAL EXAM (OUTPATIENT)
Dept: OBGYN CLINIC | Facility: CLINIC | Age: 49
End: 2019-05-08
Payer: COMMERCIAL

## 2019-05-08 VITALS
WEIGHT: 181 LBS | SYSTOLIC BLOOD PRESSURE: 114 MMHG | DIASTOLIC BLOOD PRESSURE: 72 MMHG | BODY MASS INDEX: 25.91 KG/M2 | HEIGHT: 70 IN

## 2019-05-08 DIAGNOSIS — Z01.419 WOMEN'S ANNUAL ROUTINE GYNECOLOGICAL EXAMINATION: ICD-10-CM

## 2019-05-08 DIAGNOSIS — Z12.39 BREAST CANCER SCREENING: Primary | ICD-10-CM

## 2019-05-08 PROCEDURE — 99396 PREV VISIT EST AGE 40-64: CPT | Performed by: OBSTETRICS & GYNECOLOGY

## 2019-05-08 RX ORDER — DIPHENOXYLATE HYDROCHLORIDE AND ATROPINE SULFATE 2.5; .025 MG/1; MG/1
1 TABLET ORAL DAILY
COMMUNITY

## 2019-05-19 ENCOUNTER — HOSPITAL ENCOUNTER (EMERGENCY)
Facility: HOSPITAL | Age: 49
Discharge: HOME | End: 2019-05-19
Attending: EMERGENCY MEDICINE
Payer: COMMERCIAL

## 2019-05-19 ENCOUNTER — APPOINTMENT (EMERGENCY)
Dept: CT IMAGING | Facility: HOSPITAL | Age: 49
End: 2019-05-19
Payer: COMMERCIAL

## 2019-05-19 VITALS
WEIGHT: 182.98 LBS | TEMPERATURE: 97.6 F | SYSTOLIC BLOOD PRESSURE: 100 MMHG | OXYGEN SATURATION: 96 % | HEIGHT: 69 IN | HEART RATE: 70 BPM | RESPIRATION RATE: 15 BRPM | BODY MASS INDEX: 27.1 KG/M2 | DIASTOLIC BLOOD PRESSURE: 61 MMHG

## 2019-05-19 DIAGNOSIS — H11.30 SUBCONJUNCTIVAL HEMORRHAGE: Primary | ICD-10-CM

## 2019-05-19 DIAGNOSIS — M54.2 NECK PAIN: ICD-10-CM

## 2019-05-19 LAB
ABO GROUP BLD: NORMAL
ALBUMIN SERPL BCP-MCNC: 3.6 G/DL (ref 3.5–5)
ALP SERPL-CCNC: 60 U/L (ref 46–116)
ALT SERPL W P-5'-P-CCNC: 34 U/L (ref 12–78)
ANION GAP SERPL CALCULATED.3IONS-SCNC: 6 MMOL/L (ref 4–13)
APTT PPP: 31 SECONDS (ref 26–38)
AST SERPL W P-5'-P-CCNC: 30 U/L (ref 5–45)
BASOPHILS # BLD AUTO: 0.05 THOUSANDS/ΜL (ref 0–0.1)
BASOPHILS NFR BLD AUTO: 1 % (ref 0–1)
BILIRUB SERPL-MCNC: 0.3 MG/DL (ref 0.2–1)
BLD GP AB SCN SERPL QL: NEGATIVE
BUN SERPL-MCNC: 13 MG/DL (ref 5–25)
CALCIUM SERPL-MCNC: 8.9 MG/DL (ref 8.3–10.1)
CHLORIDE SERPL-SCNC: 102 MMOL/L (ref 100–108)
CO2 SERPL-SCNC: 31 MMOL/L (ref 21–32)
CREAT SERPL-MCNC: 0.74 MG/DL (ref 0.6–1.3)
EOSINOPHIL # BLD AUTO: 0.08 THOUSAND/ΜL (ref 0–0.61)
EOSINOPHIL NFR BLD AUTO: 1 % (ref 0–6)
GFR SERPL CREATININE-BSD FRML MDRD: 95 ML/MIN/1.73SQ M
GLUCOSE SERPL-MCNC: 75 MG/DL (ref 65–140)
HCT VFR BLD AUTO: 44.9 % (ref 34.8–46.1)
HGB BLD-MCNC: 14.7 G/DL (ref 11.5–15.4)
IMM GRANULOCYTES # BLD AUTO: 0.03 THOUSAND/UL (ref 0–0.2)
IMM GRANULOCYTES NFR BLD AUTO: 0 % (ref 0–2)
INR PPP: 1 (ref 0.86–1.17)
LYMPHOCYTES # BLD AUTO: 2.16 THOUSANDS/ΜL (ref 0.6–4.47)
LYMPHOCYTES NFR BLD AUTO: 27 % (ref 14–44)
MCH RBC QN AUTO: 28.2 PG (ref 26.8–34.3)
MCHC RBC AUTO-ENTMCNC: 32.7 G/DL (ref 31.4–37.4)
MCV RBC AUTO: 86 FL (ref 82–98)
MONOCYTES # BLD AUTO: 0.66 THOUSAND/ΜL (ref 0.17–1.22)
MONOCYTES NFR BLD AUTO: 8 % (ref 4–12)
NEUTROPHILS # BLD AUTO: 5.03 THOUSANDS/ΜL (ref 1.85–7.62)
NEUTS SEG NFR BLD AUTO: 63 % (ref 43–75)
NRBC BLD AUTO-RTO: 0 /100 WBCS
PLATELET # BLD AUTO: 314 THOUSANDS/UL (ref 149–390)
PMV BLD AUTO: 9.4 FL (ref 8.9–12.7)
POTASSIUM SERPL-SCNC: 4.2 MMOL/L (ref 3.5–5.3)
PROT SERPL-MCNC: 7.6 G/DL (ref 6.4–8.2)
PROTHROMBIN TIME: 12.9 SECONDS (ref 11.8–14.2)
RBC # BLD AUTO: 5.21 MILLION/UL (ref 3.81–5.12)
RH BLD: POSITIVE
SODIUM SERPL-SCNC: 139 MMOL/L (ref 136–145)
SPECIMEN EXPIRATION DATE: NORMAL
WBC # BLD AUTO: 8.01 THOUSAND/UL (ref 4.31–10.16)

## 2019-05-19 PROCEDURE — 86900 BLOOD TYPING SEROLOGIC ABO: CPT | Performed by: EMERGENCY MEDICINE

## 2019-05-19 PROCEDURE — 99284 EMERGENCY DEPT VISIT MOD MDM: CPT

## 2019-05-19 PROCEDURE — 85610 PROTHROMBIN TIME: CPT | Performed by: EMERGENCY MEDICINE

## 2019-05-19 PROCEDURE — 99284 EMERGENCY DEPT VISIT MOD MDM: CPT | Performed by: EMERGENCY MEDICINE

## 2019-05-19 PROCEDURE — 85025 COMPLETE CBC W/AUTO DIFF WBC: CPT | Performed by: EMERGENCY MEDICINE

## 2019-05-19 PROCEDURE — 86901 BLOOD TYPING SEROLOGIC RH(D): CPT | Performed by: EMERGENCY MEDICINE

## 2019-05-19 PROCEDURE — 72125 CT NECK SPINE W/O DYE: CPT

## 2019-05-19 PROCEDURE — 70480 CT ORBIT/EAR/FOSSA W/O DYE: CPT

## 2019-05-19 PROCEDURE — 70450 CT HEAD/BRAIN W/O DYE: CPT

## 2019-05-19 PROCEDURE — 85730 THROMBOPLASTIN TIME PARTIAL: CPT | Performed by: EMERGENCY MEDICINE

## 2019-05-19 PROCEDURE — 86850 RBC ANTIBODY SCREEN: CPT | Performed by: EMERGENCY MEDICINE

## 2019-05-19 PROCEDURE — 80053 COMPREHEN METABOLIC PANEL: CPT | Performed by: EMERGENCY MEDICINE

## 2019-05-19 PROCEDURE — 36415 COLL VENOUS BLD VENIPUNCTURE: CPT | Performed by: EMERGENCY MEDICINE

## 2019-05-19 RX ORDER — CYCLOBENZAPRINE HCL 10 MG
10 TABLET ORAL 2 TIMES DAILY PRN
Qty: 20 TABLET | Refills: 0 | Status: SHIPPED | OUTPATIENT
Start: 2019-05-19 | End: 2021-02-09

## 2019-09-11 ENCOUNTER — OFFICE VISIT (OUTPATIENT)
Dept: BARIATRICS | Facility: CLINIC | Age: 49
End: 2019-09-11
Payer: COMMERCIAL

## 2019-09-11 VITALS
TEMPERATURE: 97.8 F | RESPIRATION RATE: 20 BRPM | WEIGHT: 178.7 LBS | SYSTOLIC BLOOD PRESSURE: 118 MMHG | DIASTOLIC BLOOD PRESSURE: 70 MMHG | BODY MASS INDEX: 27.08 KG/M2 | HEIGHT: 68 IN | HEART RATE: 86 BPM

## 2019-09-11 DIAGNOSIS — Z98.84 BARIATRIC SURGERY STATUS: Primary | ICD-10-CM

## 2019-09-11 DIAGNOSIS — K21.9 GASTROESOPHAGEAL REFLUX DISEASE, ESOPHAGITIS PRESENCE NOT SPECIFIED: ICD-10-CM

## 2019-09-11 DIAGNOSIS — E53.8 VITAMIN B12 DEFICIENCY: ICD-10-CM

## 2019-09-11 DIAGNOSIS — K91.2 POSTSURGICAL MALABSORPTION: ICD-10-CM

## 2019-09-11 DIAGNOSIS — E61.1 IRON DEFICIENCY: ICD-10-CM

## 2019-09-11 PROCEDURE — 99205 OFFICE O/P NEW HI 60 MIN: CPT | Performed by: PHYSICIAN ASSISTANT

## 2019-09-11 RX ORDER — OMEPRAZOLE 20 MG/1
20 CAPSULE, DELAYED RELEASE ORAL DAILY
Qty: 30 CAPSULE | Refills: 0 | Status: SHIPPED | OUTPATIENT
Start: 2019-09-11 | End: 2020-02-27 | Stop reason: SDUPTHER

## 2019-09-11 NOTE — PROGRESS NOTES
Assessment/Plan:     Patient ID: Armond Ferguson is a 44 y o  female seen today at [facility]  Bariatric Surgery Status    · Continued/Maintain healthy weight loss with good nutrition intakes  · Adequate hydration with at least 64oz  fluid intake  · Follow diet as discussed  Make sure you are eating at least 60g of protein daily  Recommended daily protein shakes  · Meet with dietitian to review healthy lifestyle choices   · Follow vitamin and mineral recommendations as reviewed with you  · Exercise as tolerated  · Follow-up in 1 year  We kindly ask that your arrive 15 minutes before your scheduled appointment time with your provider to allow our staff to room you, get your vital signs and update your chart  · Get lab work done prior to annual visit  Please call the office if you need a script  It is recommended to check with your insurance BEFORE getting labs done to make sure they are covered by your policy  · Call our office if you have any problems with abdominal pain especially associated with fever, chills, nausea, vomiting or any other concerns  · All  Post-bariatric surgery patients should be aware that very small quantities of any alcohol can cause impairment and it is very possible not to feel the effect  The effect can be in the system for several hours  It is also a stomach irritant  · It is advised to AVOID alcohol, Nonsteroidal antiinflammatory drugs (NSAIDS) and nicotine of all forms   Any of these can cause stomach irritation/pain  · Keep up the good work!      Postsurgical Malabsorption   -At risk for malabsorption of vitamins/minerals secondary to malabsorption and restriction of intake from bariatric surgery  -Currently taking adequate postop bariatric surgery vitamin supplementation  -Last set of bariatric labs completed on 2/12/2019 and showed low vitamin B12 and low vitamin D  -Next set of bariatric labs ordered for approximately 2 weeks  -Patient received education about the importance of adhering to a lifelong supplementation regimen to avoid vitamin/mineral deficiencies   - Follows with hematology for twice yearly iron infusions  - Receives once monthly vitamin B12 injections     Vitamin B12 deficiency  Your vitamin B12 level is low at 93 pg/mL  Levels can affect your nerve health  Low levels can also lead to anemia and fatigue  Do not taking gummy multivitamins because they do not contain enough B vitamins  Continue once monthly vitamin injections  Will recheck level  If its returns to normal we will change the regimen  Recommend that you take 1000 mcg vitamin B12 daily  Your levels should be checked again in 3 months  Once your levels have returned to normal, resume normal daily dose of 350-500 mcg/day of vitamin B12  Iron deficiency  Your Ferritin level is low at <4ng/mL  Continue following with [facility]  Will recheck labs for possible regimen change  GERD  - Start taking omeprazole 20mg capsule once daily  - EGD; future  - Stop omeprazole 4 days prior to EGD  - Follow up with Dr Ramakrishna Serrano regarding EGD results and reflux procedure     Subjective:      Patient ID: Shiloh Manzo is a 44 y o  female  -s/p Jonathon-En-Y Gastric Bypass with Dr Rajendra Aaron in 2004  Presents to the office today to establish care  Tolerating diet without issues; denies N/V, dysphagia  Overall doing Well  Patient complains of reflux x 4 years, recently worse within the last year  She states that she has episodes two times a week of throat and chest burning  She also will wake at night due to regurgitation and chest pains  Denies taking any antacid medication  Patient has not had any bariatric follow up since her one year post operative appointment  Initial: 268  Current: 178 7  Merlin: 156 one year postoperatively   Current BMI is Body mass index is 27 17 kg/m²      · Tolerating a regular diet-yes  · Eating at least 60 grams of protein per day-no  · Following 30/60 minute rule with liquids-no  · Drinking at least 64 ounces of fluid per day-yes  · Drinking carbonated beverages-no  · Sufficient exercise-yes  · Using NSAIDs regularly-no  · Using nicotine-no  · Using alcohol- Socially   · Supplements: Multivitamins, Iron and B-12    The following portions of the patient's history were reviewed and updated as appropriate: allergies, current medications, past family history, past medical history, past social history, past surgical history and problem list     Review of Systems   Constitutional: Negative for activity change, appetite change, fever and unexpected weight change  HENT: Negative for trouble swallowing and voice change  Regurgitation at night   Eyes: Negative for visual disturbance  Respiratory: Negative for apnea, cough and choking  Cardiovascular: Negative for chest pain  Gastrointestinal: Negative for abdominal distention, abdominal pain, blood in stool, constipation, diarrhea, nausea and vomiting  Neurological: Negative for dizziness and light-headedness  Psychiatric/Behavioral: Negative  Objective:    /70   Pulse 86   Temp 97 8 °F (36 6 °C) (Temporal)   Resp 20   Ht 5' 8" (1 727 m)   Wt 81 1 kg (178 lb 11 2 oz)   BMI 27 17 kg/m²      Physical Exam   Constitutional: She is oriented to person, place, and time  She appears well-developed and well-nourished  No distress  HENT:   Head: Normocephalic and atraumatic  Eyes: Conjunctivae and EOM are normal  No scleral icterus  Neck: Normal range of motion  Neck supple  Cardiovascular: Normal rate and normal heart sounds  No murmur heard  Pulmonary/Chest: Effort normal and breath sounds normal  No respiratory distress  Abdominal: Soft  She exhibits no distension  There is no tenderness  Musculoskeletal: Normal range of motion  Neurological: She is alert and oriented to person, place, and time  Skin: Skin is warm and dry     Psychiatric: She has a normal mood and affect  Her behavior is normal    Vitals reviewed

## 2019-09-11 NOTE — H&P (VIEW-ONLY)
Assessment/Plan:     Patient ID: Thomas Zamudio is a 44 y o  female seen today at [address] [place] [address]   Bariatric Surgery Status    · Continued/Maintain healthy weight loss with good nutrition intakes  · Adequate hydration with at least 64oz  fluid intake  · Follow diet as discussed  Make sure you are eating at least 60g of protein daily  Recommended daily protein shakes  · Meet with dietitian to review healthy lifestyle choices   · Follow vitamin and mineral recommendations as reviewed with you  · Exercise as tolerated  · Follow-up in 1 year  We kindly ask that your arrive 15 minutes before your scheduled appointment time with your provider to allow our staff to room you, get your vital signs and update your chart  · Get lab work done prior to annual visit  Please call the office if you need a script  It is recommended to check with your insurance BEFORE getting labs done to make sure they are covered by your policy  · Call our office if you have any problems with abdominal pain especially associated with fever, chills, nausea, vomiting or any other concerns  · All  Post-bariatric surgery patients should be aware that very small quantities of any alcohol can cause impairment and it is very possible not to feel the effect  The effect can be in the system for several hours  It is also a stomach irritant  · It is advised to AVOID alcohol, Nonsteroidal antiinflammatory drugs (NSAIDS) and nicotine of all forms   Any of these can cause stomach irritation/pain  · Keep up the good work!      Postsurgical Malabsorption   -At risk for malabsorption of vitamins/minerals secondary to malabsorption and restriction of intake from bariatric surgery  -Currently taking adequate postop bariatric surgery vitamin supplementation  -Last set of bariatric labs completed on 2/12/2019 and showed low vitamin B12 and low vitamin D  -Next set of bariatric labs ordered for approximately 2 weeks  -Patient received education about the importance of adhering to a lifelong supplementation regimen to avoid vitamin/mineral deficiencies   - Follows with hematology for twice yearly iron infusions  - Receives once monthly vitamin B12 injections     Vitamin B12 deficiency  Your vitamin B12 level is low at 93 pg/mL  Levels can affect your nerve health  Low levels can also lead to anemia and fatigue  Do not taking gummy multivitamins because they do not contain enough B vitamins  Continue once monthly vitamin injections  Will recheck level  If its returns to normal we will change the regimen  Recommend that you take 1000 mcg vitamin B12 daily  Your levels should be checked again in 3 months  Once your levels have returned to normal, resume normal daily dose of 350-500 mcg/day of vitamin B12  Iron deficiency  Your Ferritin level is low at <4ng/mL  Continue following with [facility]  Will recheck labs for possible regimen change  GERD  - Start taking omeprazole 20mg capsule once daily  - EGD; future  - Stop omeprazole 4 days prior to EGD  - Follow up with Dr Keisha Jamison regarding EGD results and reflux procedure     Subjective:      Patient ID: Magda Callejas is a 44 y o  female  -s/p Jonathon-En-Y Gastric Bypass with Dr Thi Martines in 2004  Presents to the office today to establish care  Tolerating diet without issues; denies N/V, dysphagia  Overall doing Well  Patient complains of reflux x 4 years, recently worse within the last year  She states that she has episodes two times a week of throat and chest burning  She also will wake at night due to regurgitation and chest pains  Denies taking any antacid medication  Patient has not had any bariatric follow up since her one year post operative appointment  Initial: 268  Current: 178 7  Merlin: 156 one year postoperatively   Current BMI is Body mass index is 27 17 kg/m²      · Tolerating a regular diet-yes  · Eating at least 60 grams of protein per day-no  · Following 30/60 minute rule with liquids-no  · Drinking at least 64 ounces of fluid per day-yes  · Drinking carbonated beverages-no  · Sufficient exercise-yes  · Using NSAIDs regularly-no  · Using nicotine-no  · Using alcohol- Socially   · Supplements: Multivitamins, Iron and B-12    The following portions of the patient's history were reviewed and updated as appropriate: allergies, current medications, past family history, past medical history, past social history, past surgical history and problem list     Review of Systems   Constitutional: Negative for activity change, appetite change, fever and unexpected weight change  HENT: Negative for trouble swallowing and voice change  Regurgitation at night   Eyes: Negative for visual disturbance  Respiratory: Negative for apnea, cough and choking  Cardiovascular: Negative for chest pain  Gastrointestinal: Negative for abdominal distention, abdominal pain, blood in stool, constipation, diarrhea, nausea and vomiting  Neurological: Negative for dizziness and light-headedness  Psychiatric/Behavioral: Negative  Objective:    /70   Pulse 86   Temp 97 8 °F (36 6 °C) (Temporal)   Resp 20   Ht 5' 8" (1 727 m)   Wt 81 1 kg (178 lb 11 2 oz)   BMI 27 17 kg/m²      Physical Exam   Constitutional: She is oriented to person, place, and time  She appears well-developed and well-nourished  No distress  HENT:   Head: Normocephalic and atraumatic  Eyes: Conjunctivae and EOM are normal  No scleral icterus  Neck: Normal range of motion  Neck supple  Cardiovascular: Normal rate and normal heart sounds  No murmur heard  Pulmonary/Chest: Effort normal and breath sounds normal  No respiratory distress  Abdominal: Soft  She exhibits no distension  There is no tenderness  Musculoskeletal: Normal range of motion  Neurological: She is alert and oriented to person, place, and time  Skin: Skin is warm and dry     Psychiatric: She has a normal mood and affect  Her behavior is normal    Vitals reviewed

## 2019-09-23 ENCOUNTER — APPOINTMENT (OUTPATIENT)
Dept: LAB | Facility: CLINIC | Age: 49
End: 2019-09-23
Payer: COMMERCIAL

## 2019-09-23 DIAGNOSIS — E61.1 IRON DEFICIENCY: ICD-10-CM

## 2019-09-23 DIAGNOSIS — E53.8 VITAMIN B12 DEFICIENCY: ICD-10-CM

## 2019-09-23 DIAGNOSIS — K91.2 POSTSURGICAL MALABSORPTION: ICD-10-CM

## 2019-09-23 LAB
25(OH)D3 SERPL-MCNC: 14.9 NG/ML (ref 30–100)
FERRITIN SERPL-MCNC: 17 NG/ML (ref 8–388)
FOLATE SERPL-MCNC: >20 NG/ML (ref 3.1–17.5)
IRON SATN MFR SERPL: 20 %
IRON SERPL-MCNC: 71 UG/DL (ref 50–170)
PTH-INTACT SERPL-MCNC: 83 PG/ML (ref 18.4–80.1)
TIBC SERPL-MCNC: 349 UG/DL (ref 250–450)
VIT B12 SERPL-MCNC: 109 PG/ML (ref 100–900)

## 2019-09-23 PROCEDURE — 83540 ASSAY OF IRON: CPT

## 2019-09-23 PROCEDURE — 83550 IRON BINDING TEST: CPT

## 2019-09-23 PROCEDURE — 84425 ASSAY OF VITAMIN B-1: CPT

## 2019-09-23 PROCEDURE — 84590 ASSAY OF VITAMIN A: CPT

## 2019-09-23 PROCEDURE — 82525 ASSAY OF COPPER: CPT

## 2019-09-23 PROCEDURE — 82306 VITAMIN D 25 HYDROXY: CPT

## 2019-09-23 PROCEDURE — 82607 VITAMIN B-12: CPT

## 2019-09-23 PROCEDURE — 82728 ASSAY OF FERRITIN: CPT

## 2019-09-23 PROCEDURE — 36415 COLL VENOUS BLD VENIPUNCTURE: CPT

## 2019-09-23 PROCEDURE — 83970 ASSAY OF PARATHORMONE: CPT

## 2019-09-23 PROCEDURE — 82746 ASSAY OF FOLIC ACID SERUM: CPT

## 2019-09-23 PROCEDURE — 84630 ASSAY OF ZINC: CPT

## 2019-09-25 LAB
COPPER SERPL-MCNC: 118 UG/DL (ref 72–166)
ZINC SERPL-MCNC: 61 UG/DL (ref 56–134)

## 2019-09-26 LAB
VIT A SERPL-MCNC: 38.8 UG/DL (ref 20.1–62)
VIT B1 BLD-SCNC: 149.2 NMOL/L (ref 66.5–200)

## 2019-09-27 DIAGNOSIS — E55.9 VITAMIN D DEFICIENCY: ICD-10-CM

## 2019-09-27 DIAGNOSIS — K91.2 POSTSURGICAL MALABSORPTION: Primary | ICD-10-CM

## 2019-09-27 RX ORDER — ERGOCALCIFEROL 1.25 MG/1
50000 CAPSULE ORAL
Qty: 8 CAPSULE | Refills: 2 | Status: SHIPPED | OUTPATIENT
Start: 2019-09-30 | End: 2020-02-27 | Stop reason: SDUPTHER

## 2019-10-01 ENCOUNTER — ANESTHESIA EVENT (OUTPATIENT)
Dept: GASTROENTEROLOGY | Facility: HOSPITAL | Age: 49
End: 2019-10-01

## 2019-10-02 ENCOUNTER — HOSPITAL ENCOUNTER (OUTPATIENT)
Dept: GASTROENTEROLOGY | Facility: HOSPITAL | Age: 49
Setting detail: OUTPATIENT SURGERY
Discharge: HOME | End: 2019-10-02
Attending: SURGERY | Admitting: SURGERY
Payer: COMMERCIAL

## 2019-10-02 ENCOUNTER — ANESTHESIA (OUTPATIENT)
Dept: GASTROENTEROLOGY | Facility: HOSPITAL | Age: 49
End: 2019-10-02

## 2019-10-02 VITALS
SYSTOLIC BLOOD PRESSURE: 113 MMHG | TEMPERATURE: 97.2 F | RESPIRATION RATE: 16 BRPM | HEART RATE: 63 BPM | OXYGEN SATURATION: 98 % | DIASTOLIC BLOOD PRESSURE: 71 MMHG

## 2019-10-02 DIAGNOSIS — E66.01 MORBID OBESITY (HCC): ICD-10-CM

## 2019-10-02 LAB
EXT PREGNANCY TEST URINE: NEGATIVE
EXT. CONTROL: NORMAL

## 2019-10-02 PROCEDURE — 88305 TISSUE EXAM BY PATHOLOGIST: CPT | Performed by: PATHOLOGY

## 2019-10-02 PROCEDURE — 81025 URINE PREGNANCY TEST: CPT | Performed by: ANESTHESIOLOGY

## 2019-10-02 PROCEDURE — 43239 EGD BIOPSY SINGLE/MULTIPLE: CPT | Performed by: SURGERY

## 2019-10-02 RX ORDER — LIDOCAINE HYDROCHLORIDE 10 MG/ML
INJECTION, SOLUTION INFILTRATION; PERINEURAL AS NEEDED
Status: DISCONTINUED | OUTPATIENT
Start: 2019-10-02 | End: 2019-10-02 | Stop reason: SURG

## 2019-10-02 RX ORDER — PROPOFOL 10 MG/ML
INJECTION, EMULSION INTRAVENOUS AS NEEDED
Status: DISCONTINUED | OUTPATIENT
Start: 2019-10-02 | End: 2019-10-02 | Stop reason: SURG

## 2019-10-02 RX ORDER — SODIUM CHLORIDE 9 MG/ML
125 INJECTION, SOLUTION INTRAVENOUS CONTINUOUS
Status: DISCONTINUED | OUTPATIENT
Start: 2019-10-02 | End: 2019-10-06 | Stop reason: HOSPADM

## 2019-10-02 RX ADMIN — SODIUM CHLORIDE: 0.9 INJECTION, SOLUTION INTRAVENOUS at 08:30

## 2019-10-02 RX ADMIN — PROPOFOL 50 MG: 10 INJECTION, EMULSION INTRAVENOUS at 08:38

## 2019-10-02 RX ADMIN — PROPOFOL 100 MG: 10 INJECTION, EMULSION INTRAVENOUS at 08:35

## 2019-10-02 RX ADMIN — LIDOCAINE HYDROCHLORIDE 50 MG: 10 INJECTION, SOLUTION INFILTRATION; PERINEURAL at 08:35

## 2019-10-02 NOTE — ANESTHESIA PREPROCEDURE EVALUATION
Review of Systems/Medical History  Patient summary reviewed  Chart reviewed  No history of anesthetic complications     Cardiovascular  Negative cardio ROS   Comment: Palpitations ,  Pulmonary  Shortness of breath,        GI/Hepatic    GERD , Bariatric surgery,        Negative  ROS        Endo/Other  Negative endo/other ROS      GYN      Comment: c section      Hematology  Anemia ,     Musculoskeletal    Comment: l RTC      Neurology   Psychology           Physical Exam    Airway    Mallampati score: II  TM Distance: >3 FB  Neck ROM: full     Dental   No notable dental hx     Cardiovascular  Comment: Negative ROS, Rhythm: regular, Rate: normal, Cardiovascular exam normal    Pulmonary  Pulmonary exam normal Breath sounds clear to auscultation,     Other Findings        Anesthesia Plan  ASA Score- 2     Anesthesia Type- general and IV sedation with anesthesia with ASA Monitors  Additional Monitors:   Airway Plan:         Plan Factors-    Induction-     Postoperative Plan-     Informed Consent- Anesthetic plan and risks discussed with patient

## 2019-10-02 NOTE — INTERVAL H&P NOTE
H&P reviewed  After examining the patient I find no changes in the patients condition since the H&P had been written      Vitals:    10/02/19 0711   BP: 114/64   Pulse: 63   Resp: 18   Temp: (!) 97 2 °F (36 2 °C)   SpO2: 95%

## 2019-10-03 ENCOUNTER — OFFICE VISIT (OUTPATIENT)
Dept: BARIATRICS | Facility: CLINIC | Age: 49
End: 2019-10-03

## 2019-10-03 VITALS — WEIGHT: 179.4 LBS | HEIGHT: 68 IN | BODY MASS INDEX: 27.19 KG/M2

## 2019-10-03 DIAGNOSIS — E66.3 OVERWEIGHT: Primary | ICD-10-CM

## 2019-10-03 PROCEDURE — RECHECK

## 2019-10-03 NOTE — PROGRESS NOTES
Pt s/p open gastric bypass 2004  Lowest weight 156# from 279#  Pt has gained 8# in the past 6 months working in the [employer], overall 25#  Pt not getting adequate protein, not taking calcium  Pt advised to:     Food journal on [address]     Increase exercise to 5 days/week, 2 strength training  Follow 4552-1926 cals/day  Increase protein to 60-65 g/day, decrease carbs     Supplement with 1500 mg Ca per day  Pt to follow up with RD in 6 weeks  Unjury, Premier water, and calcium samples given

## 2019-10-17 ENCOUNTER — TELEPHONE (OUTPATIENT)
Dept: BARIATRICS | Facility: CLINIC | Age: 49
End: 2019-10-17

## 2019-10-24 ENCOUNTER — OFFICE VISIT (OUTPATIENT)
Dept: BARIATRICS | Facility: CLINIC | Age: 49
End: 2019-10-24
Payer: COMMERCIAL

## 2019-10-24 VITALS
WEIGHT: 179.5 LBS | RESPIRATION RATE: 16 BRPM | SYSTOLIC BLOOD PRESSURE: 120 MMHG | DIASTOLIC BLOOD PRESSURE: 64 MMHG | TEMPERATURE: 97 F | HEIGHT: 68 IN | BODY MASS INDEX: 27.2 KG/M2 | HEART RATE: 76 BPM

## 2019-10-24 DIAGNOSIS — Z98.84 BARIATRIC SURGERY STATUS: Primary | ICD-10-CM

## 2019-10-24 PROCEDURE — 99213 OFFICE O/P EST LOW 20 MIN: CPT | Performed by: SURGERY

## 2019-10-24 NOTE — PROGRESS NOTES
Follow Up - Bariatric Surgery   Jean-Paul Bailey 44 y o  female MRN: [id number]  Unit/Bed#:  Encounter: [id number]            Subjective:  Status post open Jonathon-en-Y gastric bypass recent upper endoscopy showed multiple small ulcers on the small bowel side of the anastomosis    Objective:         Lab, Imaging and other studies: I have personally reviewed pertinent labs  Family History: non-contributory    Meds/Allergies   all medications and allergies reviewed    Vitals: Blood pressure 120/64, pulse 76, temperature (!) 97 °F (36 1 °C), temperature source Tympanic, resp  rate 16, height 5' 8" (1 727 m), weight 81 4 kg (179 lb 8 oz), not currently breastfeeding  ,Body mass index is 27 29 kg/m²      [unfilled]    Invasive Devices     None                 Physical Exam:     NAD  Abdomen soft non-tender, incisions well healed  No palpable hernias    Assessment/Plan:    patient is s/p open Jonathon-en-Y gastric bypass with marginal ulcer she is already on PPI and Carafate and she is feeling better I will just watch the patient for now at refer to medical weight loss because she is concerned about the recent 20 lb weight gain, no need for follow-up upper endoscopy patient was informed that in case her pain recurs or if she  starts having nausea vomiting to call us immediately              Jasiel Hackett MD

## 2019-12-02 ENCOUNTER — TELEPHONE (OUTPATIENT)
Dept: BARIATRICS | Facility: CLINIC | Age: 49
End: 2019-12-02

## 2020-02-27 ENCOUNTER — OFFICE VISIT (OUTPATIENT)
Dept: BARIATRICS | Facility: CLINIC | Age: 50
End: 2020-02-27
Payer: COMMERCIAL

## 2020-02-27 VITALS
BODY MASS INDEX: 27.74 KG/M2 | TEMPERATURE: 98 F | HEIGHT: 68 IN | WEIGHT: 183 LBS | DIASTOLIC BLOOD PRESSURE: 74 MMHG | SYSTOLIC BLOOD PRESSURE: 108 MMHG | HEART RATE: 94 BPM | RESPIRATION RATE: 16 BRPM

## 2020-02-27 DIAGNOSIS — E55.9 VITAMIN D DEFICIENCY: ICD-10-CM

## 2020-02-27 DIAGNOSIS — K91.2 POSTSURGICAL MALABSORPTION: ICD-10-CM

## 2020-02-27 DIAGNOSIS — E66.9 OBESITY, CLASS II, BMI 35-39.9: Primary | ICD-10-CM

## 2020-02-27 DIAGNOSIS — K21.9 GASTROESOPHAGEAL REFLUX DISEASE, ESOPHAGITIS PRESENCE NOT SPECIFIED: ICD-10-CM

## 2020-02-27 DIAGNOSIS — Z98.84 BARIATRIC SURGERY STATUS: ICD-10-CM

## 2020-02-27 PROCEDURE — 1036F TOBACCO NON-USER: CPT | Performed by: SURGERY

## 2020-02-27 PROCEDURE — 99214 OFFICE O/P EST MOD 30 MIN: CPT | Performed by: SURGERY

## 2020-02-27 PROCEDURE — 3008F BODY MASS INDEX DOCD: CPT | Performed by: SURGERY

## 2020-02-27 RX ORDER — ERGOCALCIFEROL 1.25 MG/1
50000 CAPSULE ORAL
Qty: 8 CAPSULE | Refills: 2 | Status: SHIPPED | OUTPATIENT
Start: 2020-02-27 | End: 2020-07-28

## 2020-02-27 RX ORDER — OMEPRAZOLE 20 MG/1
20 CAPSULE, DELAYED RELEASE ORAL DAILY
Qty: 30 CAPSULE | Refills: 0 | Status: SHIPPED | OUTPATIENT
Start: 2020-02-27 | End: 2020-03-26

## 2020-02-27 RX ORDER — TOPIRAMATE 25 MG/1
25 TABLET ORAL
Qty: 30 TABLET | Refills: 3 | Status: SHIPPED | OUTPATIENT
Start: 2020-02-27 | End: 2020-03-26 | Stop reason: ALTCHOICE

## 2020-02-27 NOTE — PROGRESS NOTES
Assessment/Plan:     Diagnoses and all orders for this visit:    Obesity, Class II, BMI 35-39 9  -   Patient to start topiramate (TOPAMAX) 25 mg tablet; Take 1 tablet (25 mg total) by mouth daily at bedtime  No history of nephrolithiasis, high blood pressures, and UGI and EGD with normal anatomy    - Basic metabolic panel; Future  - Follow up in 1 month  - Patient instructed on how to take medication and potential side effects  Bariatric Surgery Status  - Status post RNY Gastric Bypass with Dr Krish Pena at [facility] in 2004  Patient tolerated surgery well with no post operative complications  Has had excellent maintained weight loss until this current year when she gained 20lbs  Gastroesophageal reflux disease, esophagitis presence not specified  -     omeprazole (PriLOSEC) 20 mg delayed release capsule; Take 1 capsule (20 mg total) by mouth daily  - EGD 10/2/2019 revealed multiple small ulcers at [address] anastomosis which patient treated with maximum medical therapy and currently has no symptoms  Postsurgical Malabsorption  -At risk for malabsorption of vitamins/minerals secondary to malabsorption and restriction of intake from bariatric surgery  -Currently taking adequate postop bariatric surgery vitamin supplementation  -Last set of bariatric labs showed high PTH and low vitamin D  Your parathyroid hormone (PTH) is elevated, and your vitamin D level is low, which can indicate that you are losing calcium from your bones  Recommend that you take 2000 IU of vitamin D3 plus 1500 to 1800 mg of calcium citrate per day  Calcium citrate is better absorbed after weight loss surgery so check that your calcium supplements contain calcium citrate  In addition,calcium needs to be taken in divided doses of 500 to 600 mg each  Your calcium supplements will also contain vitamin D, so that the total amount of vitamin D you will be taking will be 3000 to 4000 IU per day  Your level will be checked again in 4 months    If you are a woman and over 72years of age, or a man and over 79years of age, our practice  recommends that you discuss with your primary care provider ordering a bone density test ( DXA)  -Next set of bariatric labs ordered for approximately 3 months  -Patient received education about the importance of adhering to a lifelong supplementation regimen to avoid vitamin/mineral deficiencies     Subjective:      Patient ID: Judie Maxwell is a 44 y o  female  HPI: Patient presenting to clinic to discuss recent weight gain  She has not changed her diet or her exercise routine  Patient familiar to me and still making healthy lifestyle choices  The following portions of the patient's history were reviewed and updated as appropriate: allergies, current medications, past family history, past medical history, past social history, past surgical history and problem list     Review of Systems   Constitutional: Positive for fatigue and unexpected weight change  Negative for activity change, appetite change and fever  HENT: Negative for trouble swallowing          - heartburn   Gastrointestinal: Negative for abdominal distention, abdominal pain, nausea and vomiting  Objective:      /74 (BP Location: Right arm, Patient Position: Sitting, Cuff Size: Standard)   Pulse 94   Temp 98 °F (36 7 °C) (Tympanic)   Resp 16   Ht 4' 10" (1 473 m)   Wt 83 kg (183 lb)   BMI 38 25 kg/m²          Physical Exam   Constitutional: She is oriented to person, place, and time  She appears well-developed and well-nourished  No distress  HENT:   Head: Normocephalic and atraumatic  Eyes: Conjunctivae and EOM are normal  No scleral icterus  Neck: Normal range of motion  Neck supple  Cardiovascular: Normal rate  Pulmonary/Chest: Effort normal  No respiratory distress  Abdominal: Soft  She exhibits no distension  There is no tenderness  Musculoskeletal: Normal range of motion     Neurological: She is alert and oriented to person, place, and time  Skin: Skin is warm and dry  Psychiatric: She has a normal mood and affect  Her behavior is normal    Vitals reviewed          Scribe Attestation    I,:   Natasha Gamble PA-C am acting as a scribe while in the presence of the attending physician :        I,:   Cierra Wilson MD personally performed the services described in this documentation    as scribed in my presence :

## 2020-03-05 ENCOUNTER — TELEPHONE (OUTPATIENT)
Dept: BARIATRICS | Facility: CLINIC | Age: 50
End: 2020-03-05

## 2020-03-05 NOTE — TELEPHONE ENCOUNTER
Attempted to contact patient to see if Topamax was covered by insurance  When it was ordered, there appeared to be no coverage  Unsure if patient was able to  prescription  Patient to have pregnancy test completed prior to starting medication  Asked patient to call office to discuss and alerted staff of message

## 2020-03-11 NOTE — TELEPHONE ENCOUNTER
Patient called back regarding John's voicemail last week - the patient asked where to take the pregnancy take - unsure if the patient can take an OTC pregnancy test

## 2020-03-11 NOTE — TELEPHONE ENCOUNTER
Vanessa Munoz got back to us and let us know the patient can use an OTC pregnancy, left msg for patient to let her know

## 2020-03-26 DIAGNOSIS — K21.9 GASTROESOPHAGEAL REFLUX DISEASE, ESOPHAGITIS PRESENCE NOT SPECIFIED: Primary | ICD-10-CM

## 2020-03-26 DIAGNOSIS — E66.9 OBESITY, CLASS II, BMI 35-39.9: ICD-10-CM

## 2020-03-26 DIAGNOSIS — K21.9 GASTROESOPHAGEAL REFLUX DISEASE, ESOPHAGITIS PRESENCE NOT SPECIFIED: ICD-10-CM

## 2020-03-26 RX ORDER — ZONISAMIDE 100 MG/1
100 CAPSULE ORAL DAILY
Qty: 30 CAPSULE | Refills: 6 | Status: SHIPPED | OUTPATIENT
Start: 2020-03-26 | End: 2020-06-12

## 2020-03-26 RX ORDER — OMEPRAZOLE 20 MG/1
CAPSULE, DELAYED RELEASE ORAL
Qty: 90 CAPSULE | Refills: 0 | Status: SHIPPED | OUTPATIENT
Start: 2020-03-26 | End: 2020-09-03 | Stop reason: SDUPTHER

## 2020-03-26 RX ORDER — OMEPRAZOLE 20 MG/1
20 CAPSULE, DELAYED RELEASE ORAL 2 TIMES DAILY
Qty: 60 CAPSULE | Refills: 12 | Status: SHIPPED | OUTPATIENT
Start: 2020-03-26 | End: 2020-03-26

## 2020-03-26 NOTE — PROGRESS NOTES
Patient states that she is having side effects with Topamax 25mg daily  She has extremely itchy skin and increased dyspepsia  Patient agreeable to discontinue Topamax and try and alternate therapy  Patient instructed to taper Topamax by taking dose every other day for a total of 5 days  She will start zosinamide 100mg daily  Follow up in 1 month

## 2020-04-10 ENCOUNTER — TELEPHONE (OUTPATIENT)
Dept: BARIATRICS | Facility: CLINIC | Age: 50
End: 2020-04-10

## 2020-06-12 DIAGNOSIS — E66.3 OVERWEIGHT: Primary | ICD-10-CM

## 2020-06-12 RX ORDER — TOPIRAMATE 50 MG/1
50 TABLET, FILM COATED ORAL DAILY
Qty: 30 TABLET | Refills: 3 | Status: SHIPPED | OUTPATIENT
Start: 2020-06-12 | End: 2020-09-03

## 2020-07-28 DIAGNOSIS — E55.9 VITAMIN D DEFICIENCY: ICD-10-CM

## 2020-07-28 DIAGNOSIS — K91.2 POSTSURGICAL MALABSORPTION: ICD-10-CM

## 2020-07-28 RX ORDER — ERGOCALCIFEROL 1.25 MG/1
50000 CAPSULE ORAL
Qty: 8 CAPSULE | Refills: 2 | Status: SHIPPED | OUTPATIENT
Start: 2020-07-30 | End: 2022-04-15

## 2020-08-24 ENCOUNTER — ANNUAL EXAM (OUTPATIENT)
Dept: OBGYN CLINIC | Facility: CLINIC | Age: 50
End: 2020-08-24
Payer: COMMERCIAL

## 2020-08-24 VITALS
DIASTOLIC BLOOD PRESSURE: 70 MMHG | HEIGHT: 69 IN | TEMPERATURE: 98 F | SYSTOLIC BLOOD PRESSURE: 98 MMHG | BODY MASS INDEX: 25.59 KG/M2 | WEIGHT: 172.8 LBS

## 2020-08-24 DIAGNOSIS — Z12.39 BREAST CANCER SCREENING: ICD-10-CM

## 2020-08-24 DIAGNOSIS — Z01.419 WOMEN'S ANNUAL ROUTINE GYNECOLOGICAL EXAMINATION: Primary | ICD-10-CM

## 2020-08-24 PROCEDURE — 99396 PREV VISIT EST AGE 40-64: CPT | Performed by: OBSTETRICS & GYNECOLOGY

## 2020-08-24 NOTE — PATIENT INSTRUCTIONS
Patient was informed of a stable gyn examination  She will look for signs symptoms of menopause  She will make arrangements for mammogram   She consider another method of contraception after the IUD is removed in the year 2022  She return my office in 1 year

## 2020-08-24 NOTE — PROGRESS NOTES
Assessment/Plan:    The patient was informed of a stable gyn examination  The IUD string could not be seen from the cervical os  Transabdominal ultrasound showed proper placement of the IUD  This needs replaced in the year   She is in a stable sexual relationship  A Pap smear was performed  She is happy with her weight  There is no problem depression anxiety  She sees a dentist on a regular basis  She will make arrangements for mammogram   Return my office in 1 year  Subjective:      Patient ID: Sanju Estrada is a 44 y o  female  HPI    This is a 78-year-old white female, she is a  2 para 2 with 1 vaginal delivery and 1  section  Her current method of contraception includes the IUD  This should be removed in the year   She has not been sexually shin for over 10 years  Since her last visit with me she is now dating again she is very happy  There is no problem with intimacy  Denies any signs symptoms of menopause with a discussion about that  Denies any major  GI complaint  She is content with her weight  There is no problem depression or anxiety  She has a dentist on a regular basis  There are no new major family illnesses since our last visit  She will need to make arrangements for mammogram   She is doing well with COVID situation  She feels safe at home  The following portions of the patient's history were reviewed and updated as appropriate: allergies, current medications, past family history, past medical history, past social history, past surgical history and problem list     Review of Systems   All other systems reviewed and are negative  Objective:      BP 98/70   Temp 98 °F (36 7 °C)   Ht 5' 9" (1 753 m)   Wt 78 4 kg (172 lb 12 8 oz)   LMP 2020 (Exact Date)   BMI 25 52 kg/m²          Physical Exam  Constitutional:       Appearance: Normal appearance  HENT:      Head: Normocephalic and atraumatic     Eyes:      Extraocular Movements: Extraocular movements intact  Neck:      Musculoskeletal: Normal range of motion and neck supple  Cardiovascular:      Rate and Rhythm: Normal rate and regular rhythm  Pulses: Normal pulses  Heart sounds: Normal heart sounds  Pulmonary:      Effort: Pulmonary effort is normal  No respiratory distress  Breath sounds: Normal breath sounds  No stridor  No wheezing or rhonchi  Chest:      Breasts:         Right: Normal  No swelling, bleeding, inverted nipple, mass, nipple discharge or skin change  Left: Normal  No swelling, bleeding, inverted nipple, mass, nipple discharge or skin change  Abdominal:      General: Abdomen is flat  Bowel sounds are normal  There is no distension  Palpations: Abdomen is soft  There is no mass  Tenderness: There is no abdominal tenderness  There is no guarding or rebound  Hernia: No hernia is present  Musculoskeletal: Normal range of motion  Lymphadenopathy:      Upper Body:      Right upper body: No supraclavicular or axillary adenopathy  Skin:     General: Skin is warm and dry  Neurological:      General: No focal deficit present  Mental Status: She is alert and oriented to person, place, and time     Psychiatric:         Mood and Affect: Mood normal          Behavior: Behavior normal

## 2020-08-31 ENCOUNTER — APPOINTMENT (OUTPATIENT)
Dept: LAB | Facility: CLINIC | Age: 50
End: 2020-08-31
Payer: COMMERCIAL

## 2020-08-31 DIAGNOSIS — K91.2 POSTSURGICAL MALABSORPTION: ICD-10-CM

## 2020-08-31 DIAGNOSIS — E66.9 OBESITY, CLASS II, BMI 35-39.9: ICD-10-CM

## 2020-08-31 DIAGNOSIS — E55.9 VITAMIN D DEFICIENCY: ICD-10-CM

## 2020-08-31 LAB
25(OH)D3 SERPL-MCNC: 79.9 NG/ML (ref 30–100)
ANION GAP SERPL CALCULATED.3IONS-SCNC: 1 MMOL/L (ref 4–13)
BUN SERPL-MCNC: 11 MG/DL (ref 5–25)
CALCIUM SERPL-MCNC: 9 MG/DL (ref 8.3–10.1)
CHLORIDE SERPL-SCNC: 108 MMOL/L (ref 100–108)
CO2 SERPL-SCNC: 30 MMOL/L (ref 21–32)
CREAT SERPL-MCNC: 0.68 MG/DL (ref 0.6–1.3)
GFR SERPL CREATININE-BSD FRML MDRD: 101 ML/MIN/1.73SQ M
GLUCOSE SERPL-MCNC: 86 MG/DL (ref 65–140)
POTASSIUM SERPL-SCNC: 5.1 MMOL/L (ref 3.5–5.3)
PTH-INTACT SERPL-MCNC: 44.8 PG/ML (ref 18.4–80.1)
SODIUM SERPL-SCNC: 139 MMOL/L (ref 136–145)

## 2020-08-31 PROCEDURE — 36415 COLL VENOUS BLD VENIPUNCTURE: CPT

## 2020-08-31 PROCEDURE — 83970 ASSAY OF PARATHORMONE: CPT

## 2020-08-31 PROCEDURE — 82306 VITAMIN D 25 HYDROXY: CPT

## 2020-08-31 PROCEDURE — 80048 BASIC METABOLIC PNL TOTAL CA: CPT

## 2020-09-03 ENCOUNTER — OFFICE VISIT (OUTPATIENT)
Dept: BARIATRICS | Facility: CLINIC | Age: 50
End: 2020-09-03
Payer: COMMERCIAL

## 2020-09-03 VITALS
BODY MASS INDEX: 24.96 KG/M2 | RESPIRATION RATE: 18 BRPM | WEIGHT: 168.5 LBS | HEIGHT: 69 IN | TEMPERATURE: 97.8 F | SYSTOLIC BLOOD PRESSURE: 112 MMHG | DIASTOLIC BLOOD PRESSURE: 62 MMHG | HEART RATE: 68 BPM

## 2020-09-03 DIAGNOSIS — K91.2 POSTGASTRECTOMY MALABSORPTION: Primary | ICD-10-CM

## 2020-09-03 DIAGNOSIS — E66.3 OVERWEIGHT: ICD-10-CM

## 2020-09-03 DIAGNOSIS — Z98.84 BARIATRIC SURGERY STATUS: ICD-10-CM

## 2020-09-03 DIAGNOSIS — K21.9 GASTROESOPHAGEAL REFLUX DISEASE, ESOPHAGITIS PRESENCE NOT SPECIFIED: ICD-10-CM

## 2020-09-03 DIAGNOSIS — K21.9 CHRONIC GERD: ICD-10-CM

## 2020-09-03 DIAGNOSIS — Z90.3 POSTGASTRECTOMY MALABSORPTION: Primary | ICD-10-CM

## 2020-09-03 PROCEDURE — 99213 OFFICE O/P EST LOW 20 MIN: CPT | Performed by: PHYSICIAN ASSISTANT

## 2020-09-03 RX ORDER — TOPIRAMATE 50 MG/1
75 TABLET, FILM COATED ORAL DAILY
Qty: 45 TABLET | Refills: 3 | Status: SHIPPED | OUTPATIENT
Start: 2020-09-03 | End: 2020-11-03 | Stop reason: SDUPTHER

## 2020-09-03 RX ORDER — OMEPRAZOLE 20 MG/1
20 CAPSULE, DELAYED RELEASE ORAL DAILY
Qty: 90 CAPSULE | Refills: 3 | Status: SHIPPED | OUTPATIENT
Start: 2020-09-03 | End: 2020-11-03 | Stop reason: SDUPTHER

## 2020-09-03 NOTE — PROGRESS NOTES
Assessment/Plan:     Diagnoses and all orders for this visit:    Postgastrectomy malabsorption  -At risk for malabsorption of vitamins/minerals secondary to malabsorption and restriction of intake from bariatric surgery  -Currently taking adequate postop bariatric surgery vitamin supplementation  -Last set of bariatric labs completed on 9/23/2020 and showed low vitamin D but repeat labs from 8/31/2020 showed vitamin D within normal range  -Next set of bariatric labs ordered for approximately 1 month  -Patient received education about the importance of adhering to a lifelong supplementation regimen to avoid vitamin/mineral deficiencies     Chronic GERD  - Continue omeprazole 20mg once daily  - Eventually will taper off if needed due to long term side effects of omeprazole     Bariatric surgery status  -s/p Jonathon-En-Y Gastric Bypass with Dr Kristin Brice in 2004  Presents to the office today for routine follow up  Overall doing Well  · Ulcer Risk assessment:  · Discussion about strict AVOIDANCE of alcohol, NSAIDS, prednisone, and nicotine of all forms  · Continue omeprazole 20mg once daily     Overweight  · Continued/Maintain healthy weight loss with good nutrition intakes  · Adequate hydration with at least 64oz  fluid intake  · Follow diet as discussed  · Follow vitamin and mineral recommendations as reviewed with you  · Exercise as tolerated  · START topamax 75mg once daily for weight loss  Patient aware to call if she develops symptoms  Can always drop back to 50mg daily if side effects develop  Subjective:      Patient ID: Kedar Turner is a 44 y o  female  HPI: Patient presenting for Topamax weight loss medication follow up  She states that she has noticed significant weight loss on 50mg dosage  She denies any current side effects  Desires to lose 15 additional pounds  Patient states that she has been off of her omeprazole  She occasionally suffers from reflux   Would like to continue on omeprazole  Counseled on long term effects and that at some point we may need to switch her medication  Repleted her vitamin D level  Will recheck annual postsurgical malabsorption labs  Discussion held regarding her low anion gap  States that I have a very low yield to worry since her electrolytes are within normal range  Likely due to low protein levels  Encouraged protein shakes daily  The following portions of the patient's history were reviewed and updated as appropriate: allergies, current medications, past family history, past medical history, past social history, past surgical history and problem list     Review of Systems   Constitutional: Positive for activity change (Increasing exercise) and appetite change (Decreased)  Negative for fever and unexpected weight change  HENT:        + heartburn   Gastrointestinal: Negative for abdominal pain, nausea and vomiting  Psychiatric/Behavioral: Negative for agitation and behavioral problems  The patient is not nervous/anxious  Objective:      /62   Pulse 68   Temp 97 8 °F (36 6 °C)   Resp 18   Ht 5' 9" (1 753 m)   Wt 76 4 kg (168 lb 8 oz)   BMI 24 88 kg/m²          Physical Exam  Constitutional:       General: She is not in acute distress  Appearance: Normal appearance  She is normal weight  HENT:      Head: Normocephalic and atraumatic  Nose: Nose normal    Eyes:      Extraocular Movements: Extraocular movements intact  Conjunctiva/sclera: Conjunctivae normal       Pupils: Pupils are equal, round, and reactive to light  Comments: Wearing glasses on exam   Cardiovascular:      Rate and Rhythm: Normal rate  Pulmonary:      Effort: Pulmonary effort is normal  No respiratory distress  Abdominal:      Tenderness: There is no abdominal tenderness  Skin:     General: Skin is warm and dry  Neurological:      General: No focal deficit present        Mental Status: She is alert and oriented to person, place, and time    Psychiatric:         Mood and Affect: Mood normal          Behavior: Behavior normal          Thought Content:  Thought content normal          Judgment: Judgment normal

## 2020-10-05 ENCOUNTER — HOSPITAL ENCOUNTER (OUTPATIENT)
Dept: RADIOLOGY | Age: 50
Discharge: HOME | End: 2020-10-05
Payer: COMMERCIAL

## 2020-10-05 VITALS — HEIGHT: 70 IN | WEIGHT: 168 LBS | BODY MASS INDEX: 24.05 KG/M2

## 2020-10-05 DIAGNOSIS — Z12.31 ENCOUNTER FOR SCREENING MAMMOGRAM FOR MALIGNANT NEOPLASM OF BREAST: ICD-10-CM

## 2020-10-05 DIAGNOSIS — Z12.39 BREAST CANCER SCREENING: ICD-10-CM

## 2020-10-05 PROCEDURE — 77067 SCR MAMMO BI INCL CAD: CPT

## 2020-10-05 PROCEDURE — 77063 BREAST TOMOSYNTHESIS BI: CPT

## 2020-10-12 ENCOUNTER — APPOINTMENT (OUTPATIENT)
Dept: LAB | Facility: CLINIC | Age: 50
End: 2020-10-12
Payer: COMMERCIAL

## 2020-10-12 DIAGNOSIS — K91.2 POSTGASTRECTOMY MALABSORPTION: ICD-10-CM

## 2020-10-12 DIAGNOSIS — E66.3 OVERWEIGHT: ICD-10-CM

## 2020-10-12 DIAGNOSIS — Z98.84 BARIATRIC SURGERY STATUS: ICD-10-CM

## 2020-10-12 DIAGNOSIS — Z90.3 POSTGASTRECTOMY MALABSORPTION: ICD-10-CM

## 2020-10-12 LAB
ALBUMIN SERPL BCP-MCNC: 3.4 G/DL (ref 3.5–5)
ALP SERPL-CCNC: 44 U/L (ref 46–116)
ALT SERPL W P-5'-P-CCNC: 19 U/L (ref 12–78)
ANION GAP SERPL CALCULATED.3IONS-SCNC: 1 MMOL/L (ref 4–13)
AST SERPL W P-5'-P-CCNC: 19 U/L (ref 5–45)
BASOPHILS # BLD AUTO: 0.04 THOUSANDS/ΜL (ref 0–0.1)
BASOPHILS NFR BLD AUTO: 1 % (ref 0–1)
BILIRUB SERPL-MCNC: 0.66 MG/DL (ref 0.2–1)
BUN SERPL-MCNC: 9 MG/DL (ref 5–25)
CALCIUM ALBUM COR SERPL-MCNC: 9.7 MG/DL (ref 8.3–10.1)
CALCIUM SERPL-MCNC: 9.2 MG/DL (ref 8.3–10.1)
CHLORIDE SERPL-SCNC: 109 MMOL/L (ref 100–108)
CO2 SERPL-SCNC: 31 MMOL/L (ref 21–32)
CREAT SERPL-MCNC: 0.73 MG/DL (ref 0.6–1.3)
EOSINOPHIL # BLD AUTO: 0.1 THOUSAND/ΜL (ref 0–0.61)
EOSINOPHIL NFR BLD AUTO: 2 % (ref 0–6)
ERYTHROCYTE [DISTWIDTH] IN BLOOD BY AUTOMATED COUNT: 17.1 % (ref 11.6–15.1)
FERRITIN SERPL-MCNC: 9 NG/ML (ref 8–388)
FOLATE SERPL-MCNC: 11.2 NG/ML (ref 3.1–17.5)
GFR SERPL CREATININE-BSD FRML MDRD: 95 ML/MIN/1.73SQ M
GLUCOSE P FAST SERPL-MCNC: 78 MG/DL (ref 65–99)
HCT VFR BLD AUTO: 41.4 % (ref 34.8–46.1)
HGB BLD-MCNC: 13.2 G/DL (ref 11.5–15.4)
IMM GRANULOCYTES # BLD AUTO: 0.02 THOUSAND/UL (ref 0–0.2)
IMM GRANULOCYTES NFR BLD AUTO: 0 % (ref 0–2)
IRON SATN MFR SERPL: 21 %
IRON SERPL-MCNC: 75 UG/DL (ref 50–170)
LYMPHOCYTES # BLD AUTO: 1.63 THOUSANDS/ΜL (ref 0.6–4.47)
LYMPHOCYTES NFR BLD AUTO: 29 % (ref 14–44)
MCH RBC QN AUTO: 30.1 PG (ref 26.8–34.3)
MCHC RBC AUTO-ENTMCNC: 31.9 G/DL (ref 31.4–37.4)
MCV RBC AUTO: 95 FL (ref 82–98)
MONOCYTES # BLD AUTO: 0.46 THOUSAND/ΜL (ref 0.17–1.22)
MONOCYTES NFR BLD AUTO: 8 % (ref 4–12)
NEUTROPHILS # BLD AUTO: 3.37 THOUSANDS/ΜL (ref 1.85–7.62)
NEUTS SEG NFR BLD AUTO: 60 % (ref 43–75)
NRBC BLD AUTO-RTO: 0 /100 WBCS
PLATELET # BLD AUTO: 292 THOUSANDS/UL (ref 149–390)
PMV BLD AUTO: 11.1 FL (ref 8.9–12.7)
POTASSIUM SERPL-SCNC: 4.7 MMOL/L (ref 3.5–5.3)
PROT SERPL-MCNC: 6.9 G/DL (ref 6.4–8.2)
RBC # BLD AUTO: 4.38 MILLION/UL (ref 3.81–5.12)
SODIUM SERPL-SCNC: 141 MMOL/L (ref 136–145)
TIBC SERPL-MCNC: 361 UG/DL (ref 250–450)
VIT B12 SERPL-MCNC: 65 PG/ML (ref 100–900)
WBC # BLD AUTO: 5.62 THOUSAND/UL (ref 4.31–10.16)

## 2020-10-12 PROCEDURE — 36415 COLL VENOUS BLD VENIPUNCTURE: CPT

## 2020-10-12 PROCEDURE — 82525 ASSAY OF COPPER: CPT

## 2020-10-12 PROCEDURE — 82728 ASSAY OF FERRITIN: CPT

## 2020-10-12 PROCEDURE — 80053 COMPREHEN METABOLIC PANEL: CPT

## 2020-10-12 PROCEDURE — 85025 COMPLETE CBC W/AUTO DIFF WBC: CPT

## 2020-10-12 PROCEDURE — 82746 ASSAY OF FOLIC ACID SERUM: CPT

## 2020-10-12 PROCEDURE — 84630 ASSAY OF ZINC: CPT

## 2020-10-12 PROCEDURE — 83550 IRON BINDING TEST: CPT

## 2020-10-12 PROCEDURE — 84425 ASSAY OF VITAMIN B-1: CPT

## 2020-10-12 PROCEDURE — 83540 ASSAY OF IRON: CPT

## 2020-10-12 PROCEDURE — 84590 ASSAY OF VITAMIN A: CPT

## 2020-10-12 PROCEDURE — 82607 VITAMIN B-12: CPT

## 2020-10-13 ENCOUNTER — TELEPHONE (OUTPATIENT)
Dept: BARIATRICS | Facility: CLINIC | Age: 50
End: 2020-10-13

## 2020-10-13 DIAGNOSIS — K91.2 POSTSURGICAL MALABSORPTION: ICD-10-CM

## 2020-10-13 DIAGNOSIS — Z98.84 BARIATRIC SURGERY STATUS: Primary | ICD-10-CM

## 2020-10-13 DIAGNOSIS — E53.8 LOW VITAMIN B12 LEVEL: ICD-10-CM

## 2020-10-13 RX ORDER — CYANOCOBALAMIN 1000 UG/ML
1000 INJECTION INTRAMUSCULAR; SUBCUTANEOUS WEEKLY
Status: SHIPPED | OUTPATIENT
Start: 2020-10-14

## 2020-10-14 LAB — ZINC SERPL-MCNC: 79 UG/DL (ref 56–134)

## 2020-10-15 ENCOUNTER — OFFICE VISIT (OUTPATIENT)
Dept: BARIATRICS | Facility: CLINIC | Age: 50
End: 2020-10-15
Payer: COMMERCIAL

## 2020-10-15 DIAGNOSIS — E53.8 VITAMIN B12 DEFICIENCY: Primary | ICD-10-CM

## 2020-10-15 LAB — COPPER SERPL-MCNC: 76 UG/DL (ref 72–166)

## 2020-10-15 PROCEDURE — 96372 THER/PROPH/DIAG INJ SC/IM: CPT

## 2020-10-15 PROCEDURE — RECHECK

## 2020-10-15 RX ADMIN — CYANOCOBALAMIN 1000 MCG: 1000 INJECTION INTRAMUSCULAR; SUBCUTANEOUS at 15:33

## 2020-10-16 LAB — VIT B1 BLD-SCNC: 129.8 NMOL/L (ref 66.5–200)

## 2020-10-18 LAB — VIT A SERPL-MCNC: 39.5 UG/DL (ref 20.1–62)

## 2020-10-22 ENCOUNTER — OFFICE VISIT (OUTPATIENT)
Dept: BARIATRICS | Facility: CLINIC | Age: 50
End: 2020-10-22
Payer: COMMERCIAL

## 2020-10-22 DIAGNOSIS — E53.8 VITAMIN B12 DEFICIENCY: Primary | ICD-10-CM

## 2020-10-22 PROCEDURE — 96372 THER/PROPH/DIAG INJ SC/IM: CPT

## 2020-10-22 PROCEDURE — RECHECK

## 2020-10-22 RX ADMIN — CYANOCOBALAMIN 1000 MCG: 1000 INJECTION INTRAMUSCULAR; SUBCUTANEOUS at 15:21

## 2020-11-03 ENCOUNTER — OFFICE VISIT (OUTPATIENT)
Dept: BARIATRICS | Facility: CLINIC | Age: 50
End: 2020-11-03
Payer: COMMERCIAL

## 2020-11-03 VITALS
HEART RATE: 74 BPM | BODY MASS INDEX: 25.39 KG/M2 | TEMPERATURE: 97.1 F | WEIGHT: 167.5 LBS | HEIGHT: 68 IN | DIASTOLIC BLOOD PRESSURE: 60 MMHG | SYSTOLIC BLOOD PRESSURE: 124 MMHG

## 2020-11-03 DIAGNOSIS — K21.9 CHRONIC GERD: ICD-10-CM

## 2020-11-03 DIAGNOSIS — Z98.84 BARIATRIC SURGERY STATUS: ICD-10-CM

## 2020-11-03 DIAGNOSIS — E66.3 OVERWEIGHT: ICD-10-CM

## 2020-11-03 DIAGNOSIS — K21.9 GERD (GASTROESOPHAGEAL REFLUX DISEASE): ICD-10-CM

## 2020-11-03 PROCEDURE — 99213 OFFICE O/P EST LOW 20 MIN: CPT | Performed by: PHYSICIAN ASSISTANT

## 2020-11-03 PROCEDURE — 96372 THER/PROPH/DIAG INJ SC/IM: CPT | Performed by: PHYSICIAN ASSISTANT

## 2020-11-03 RX ORDER — TOPIRAMATE 50 MG/1
75 TABLET, FILM COATED ORAL DAILY
Qty: 45 TABLET | Refills: 3 | Status: SHIPPED | OUTPATIENT
Start: 2020-11-03

## 2020-11-03 RX ORDER — OMEPRAZOLE 20 MG/1
20 CAPSULE, DELAYED RELEASE ORAL DAILY
Qty: 90 CAPSULE | Refills: 3 | Status: SHIPPED | OUTPATIENT
Start: 2020-11-03 | End: 2022-04-15

## 2020-11-03 RX ORDER — OMEPRAZOLE 20 MG/1
CAPSULE, DELAYED RELEASE ORAL
Qty: 90 CAPSULE | Refills: 3 | OUTPATIENT
Start: 2020-11-03

## 2020-11-03 RX ADMIN — CYANOCOBALAMIN 1000 MCG: 1000 INJECTION INTRAMUSCULAR; SUBCUTANEOUS at 09:11

## 2020-11-12 ENCOUNTER — OFFICE VISIT (OUTPATIENT)
Dept: BARIATRICS | Facility: CLINIC | Age: 50
End: 2020-11-12

## 2020-11-12 DIAGNOSIS — E53.8 VITAMIN B12 DEFICIENCY: ICD-10-CM

## 2020-11-12 PROCEDURE — RECHECK

## 2020-11-12 PROCEDURE — 96372 THER/PROPH/DIAG INJ SC/IM: CPT | Performed by: PHYSICIAN ASSISTANT

## 2020-11-12 RX ADMIN — CYANOCOBALAMIN 1000 MCG: 1000 INJECTION INTRAMUSCULAR; SUBCUTANEOUS at 15:16

## 2021-01-05 ENCOUNTER — OFFICE VISIT (OUTPATIENT)
Dept: BARIATRICS | Facility: CLINIC | Age: 51
End: 2021-01-05
Payer: COMMERCIAL

## 2021-01-05 DIAGNOSIS — E53.8 LOW VITAMIN B12 LEVEL: Primary | ICD-10-CM

## 2021-01-05 PROCEDURE — RECHECK: Performed by: PHYSICIAN ASSISTANT

## 2021-01-05 RX ADMIN — CYANOCOBALAMIN 1000 MCG: 1000 INJECTION INTRAMUSCULAR; SUBCUTANEOUS at 11:14

## 2021-01-05 NOTE — PROGRESS NOTES
Pt here for B 12 injection  Pt given injection in Left deltoid, tolerated well and left in no distress   MB

## 2021-02-09 ENCOUNTER — HOSPITAL ENCOUNTER (EMERGENCY)
Facility: HOSPITAL | Age: 51
Discharge: HOME | End: 2021-02-09
Attending: EMERGENCY MEDICINE | Admitting: EMERGENCY MEDICINE
Payer: COMMERCIAL

## 2021-02-09 ENCOUNTER — APPOINTMENT (EMERGENCY)
Dept: RADIOLOGY | Facility: HOSPITAL | Age: 51
End: 2021-02-09
Payer: COMMERCIAL

## 2021-02-09 ENCOUNTER — APPOINTMENT (EMERGENCY)
Dept: CT IMAGING | Facility: HOSPITAL | Age: 51
End: 2021-02-09
Payer: COMMERCIAL

## 2021-02-09 VITALS
DIASTOLIC BLOOD PRESSURE: 67 MMHG | RESPIRATION RATE: 16 BRPM | TEMPERATURE: 98.4 F | SYSTOLIC BLOOD PRESSURE: 117 MMHG | OXYGEN SATURATION: 99 % | HEART RATE: 74 BPM

## 2021-02-09 DIAGNOSIS — S89.91XA INJURY OF RIGHT KNEE, INITIAL ENCOUNTER: ICD-10-CM

## 2021-02-09 DIAGNOSIS — M54.2 MUSCULOSKELETAL NECK PAIN: Primary | ICD-10-CM

## 2021-02-09 PROCEDURE — 99284 EMERGENCY DEPT VISIT MOD MDM: CPT

## 2021-02-09 PROCEDURE — G1004 CDSM NDSC: HCPCS

## 2021-02-09 PROCEDURE — 96372 THER/PROPH/DIAG INJ SC/IM: CPT

## 2021-02-09 PROCEDURE — 73564 X-RAY EXAM KNEE 4 OR MORE: CPT

## 2021-02-09 PROCEDURE — 72125 CT NECK SPINE W/O DYE: CPT

## 2021-02-09 PROCEDURE — 99284 EMERGENCY DEPT VISIT MOD MDM: CPT | Performed by: EMERGENCY MEDICINE

## 2021-02-09 RX ORDER — NAPROXEN 500 MG/1
500 TABLET ORAL 2 TIMES DAILY WITH MEALS
Qty: 14 TABLET | Refills: 0 | Status: SHIPPED | OUTPATIENT
Start: 2021-02-09 | End: 2022-04-15

## 2021-02-09 RX ORDER — KETOROLAC TROMETHAMINE 30 MG/ML
15 INJECTION, SOLUTION INTRAMUSCULAR; INTRAVENOUS ONCE
Status: COMPLETED | OUTPATIENT
Start: 2021-02-09 | End: 2021-02-09

## 2021-02-09 RX ORDER — CYCLOBENZAPRINE HCL 10 MG
5 TABLET ORAL ONCE
Status: COMPLETED | OUTPATIENT
Start: 2021-02-09 | End: 2021-02-09

## 2021-02-09 RX ORDER — CYCLOBENZAPRINE HCL 5 MG
5 TABLET ORAL 3 TIMES DAILY PRN
Qty: 15 TABLET | Refills: 0 | Status: SHIPPED | OUTPATIENT
Start: 2021-02-09 | End: 2022-04-15

## 2021-02-09 RX ADMIN — CYCLOBENZAPRINE HYDROCHLORIDE 5 MG: 10 TABLET, FILM COATED ORAL at 17:53

## 2021-02-09 RX ADMIN — KETOROLAC TROMETHAMINE 15 MG: 30 INJECTION, SOLUTION INTRAMUSCULAR; INTRAVENOUS at 17:53

## 2021-02-10 NOTE — ED PROVIDER NOTES
History  Chief Complaint   Patient presents with    Knee Pain     pt reports fell yesterday skiing has inside r knee pain and neck pain       History provided by:  Patient   used: No    Knee Pain  Associated symptoms: neck pain    Associated symptoms: no fever      Patient is a 53-year-old female presenting to emergency department with right knee pain and neck pain after skiing accident yesterday  Was cut off by a snow boarder and fell  Twisted knee  Was wearing a helmet  Helmet did not break  No headache  No loss of consciousness  Complaining of neck pain and right knee pain  Took Tylenol before arrival   No weakness numbness or tingling  Not on blood thinners  MDM will CT C-spine to evaluate for fracture, x-ray right knee    Neck pain is likely musculoskeletal   I am concerned for ligamentous injury of the knee  Patient to use crutches and be nonweightbearing  Follow-up with Orthopedics  Patient understands and will follow up      Prior to Admission Medications   Prescriptions Last Dose Informant Patient Reported?  Taking?   ergocalciferol (VITAMIN D2) 50,000 units   No No   Sig: TAKE 1 CAPSULE (50,000 UNITS TOTAL) BY MOUTH 2 (TWO) TIMES A WEEK WITH MEALS   multivitamin (THERAGRAN) TABS   Yes No   Sig: Take 1 tablet by mouth daily   omeprazole (PriLOSEC) 20 mg delayed release capsule   No No   Sig: Take 1 capsule (20 mg total) by mouth daily   topiramate (TOPAMAX) 50 MG tablet   No No   Sig: Take 1 5 tablets (75 mg total) by mouth daily      Facility-Administered Medications Last Administration Doses Remaining   cyanocobalamin injection 1,000 mcg 2021 11:14 AM           Past Medical History:   Diagnosis Date    Anemia     B12 deficiency     Basal cell carcinoma     Gastric bypass status for obesity     Shoulder bursitis        Past Surgical History:   Procedure Laterality Date     SECTION      GASTRIC BYPASS      for morbid obesity   Jonathon-en-Y     HEMORRHOID SURGERY         Family History   Problem Relation Age of Onset    Lung cancer Mother         was a smoker    Pancreatic cancer Father     Skin cancer Maternal Grandfather     No Known Problems Maternal Aunt     No Known Problems Maternal Aunt     No Known Problems Maternal Aunt     No Known Problems Maternal Aunt     No Known Problems Paternal Aunt     No Known Problems Paternal Aunt     No Known Problems Paternal Aunt     No Known Problems Paternal Aunt     No Known Problems Paternal Aunt      I have reviewed and agree with the history as documented  E-Cigarette/Vaping    E-Cigarette Use Never User      E-Cigarette/Vaping Substances     Social History     Tobacco Use    Smoking status: Never Smoker    Smokeless tobacco: Never Used   Substance Use Topics    Alcohol use: Yes     Comment: rarely    Drug use: No       Review of Systems   Constitutional: Negative for chills, diaphoresis and fever  HENT: Negative for congestion and sore throat  Respiratory: Negative for cough, shortness of breath, wheezing and stridor  Cardiovascular: Negative for chest pain, palpitations and leg swelling  Gastrointestinal: Negative for abdominal pain, blood in stool, diarrhea, nausea and vomiting  Genitourinary: Negative for dysuria, frequency and urgency  Musculoskeletal: Positive for neck pain  Negative for neck stiffness  Skin: Negative for pallor and rash  Neurological: Negative for dizziness, syncope, weakness, light-headedness and headaches  All other systems reviewed and are negative  Physical Exam  Physical Exam  Vitals signs reviewed  Constitutional:       Appearance: She is well-developed  HENT:      Head: Normocephalic and atraumatic  Eyes:      Pupils: Pupils are equal, round, and reactive to light  Neck:      Musculoskeletal: Neck supple  Cardiovascular:      Rate and Rhythm: Normal rate and regular rhythm  Pulses: Normal pulses        Heart sounds: Normal heart sounds  Pulmonary:      Effort: Pulmonary effort is normal  No respiratory distress  Breath sounds: Normal breath sounds  Abdominal:      General: Bowel sounds are normal       Palpations: Abdomen is soft  Tenderness: There is no abdominal tenderness  Musculoskeletal:      Comments: Decreased range of motion of the right knee due to pain  Tenderness noted over the medial collateral ligament  Neurovascular intact distally  No hip tenderness  Left-sided neck pain  Tender  Skin:     General: Skin is warm and dry  Capillary Refill: Capillary refill takes less than 2 seconds  Neurological:      General: No focal deficit present  Mental Status: She is alert and oriented to person, place, and time  Cranial Nerves: No cranial nerve deficit  Sensory: No sensory deficit  Motor: No weakness  Coordination: Coordination normal          Vital Signs  ED Triage Vitals [02/09/21 1632]   Temperature Pulse Respirations Blood Pressure SpO2   98 4 °F (36 9 °C) 74 16 117/67 99 %      Temp Source Heart Rate Source Patient Position - Orthostatic VS BP Location FiO2 (%)   Temporal Monitor Sitting Left arm --      Pain Score       No Pain           Vitals:    02/09/21 1632   BP: 117/67   Pulse: 74   Patient Position - Orthostatic VS: Sitting         Visual Acuity      ED Medications  Medications   ketorolac (TORADOL) injection 15 mg (15 mg Intramuscular Given 2/9/21 1753)   cyclobenzaprine (FLEXERIL) tablet 5 mg (5 mg Oral Given 2/9/21 1753)       Diagnostic Studies  Results Reviewed     None                 CT cervical spine without contrast   Final Result by Christelle Daily MD (02/09 1733)      No cervical spine fracture or traumatic malalignment                     Workstation performed: [id number]         XR knee 4+ views Right injury   ED Interpretation by Armando Charlton MD (02/09 1704)   No acute abnormalities      Final Result by Sarahy Ferguson DO (02/09 1720)      No acute osseous abnormality  Workstation performed: [id number]                    Procedures  Procedures         ED Course                                           MDM    Disposition  Final diagnoses:   Musculoskeletal neck pain   Injury of right knee, initial encounter     Time reflects when diagnosis was documented in both MDM as applicable and the Disposition within this note     Time User Action Codes Description Comment    2/9/2021  5:36 PM Claudia Huertas Add [M54 2] Musculoskeletal neck pain     2/9/2021  5:36 PM Claudia Honeycutt Add [S89 91XA] Injury of right knee, initial encounter       ED Disposition     ED Disposition Condition Date/Time Comment    Discharge Stable Tue Feb 9, 2021  5:36 PM Tj Lima discharge to home/self care              Follow-up Information     Follow up With Specialties Details Why Contact Info Additional Information    [address] Emergency Department Emergency Medicine  As needed, If symptoms worsen [address] [facility] [address] [facility] Emergency Department, [address], [place], [address] [facility], [address] [facility] [place] [facility] Schedule an appointment as soon as possible for a visit  Right knee sprain, concern for ligamentous injury [address] [facility] [phone]  [address] [facility] [place], [facility], [address] [place], [place], [address] [place] [address]          Discharge Medication List as of 2/9/2021  5:38 PM      START taking these medications    Details   cyclobenzaprine (FLEXERIL) 5 mg tablet Take 1 tablet (5 mg total) by mouth 3 (three) times a day as needed for muscle spasms for up to 5 days, Starting Tue 2/9/2021, Until Sun 2/14/2021, Normal      naproxen (EC NAPROSYN) 500 MG EC tablet Take 1 tablet (500 mg total) by mouth 2 (two) times a day with meals for 7 days, Starting Tue 2/9/2021, Until Tue 2/16/2021, Normal         CONTINUE these medications which have NOT CHANGED    Details   ergocalciferol (VITAMIN D2) 50,000 units TAKE 1 CAPSULE (50,000 UNITS TOTAL) BY MOUTH 2 (TWO) TIMES A WEEK WITH MEALS, Starting Thu 7/30/2020, Normal      multivitamin (THERAGRAN) TABS Take 1 tablet by mouth daily, Historical Med      omeprazole (PriLOSEC) 20 mg delayed release capsule Take 1 capsule (20 mg total) by mouth daily, Starting Tue 11/3/2020, Normal      topiramate (TOPAMAX) 50 MG tablet Take 1 5 tablets (75 mg total) by mouth daily, Starting Tue 11/3/2020, Normal           No discharge procedures on file      PDMP Review     None          ED Provider  Electronically Signed by           Bony Richards MD  02/10/21 [id number]

## 2021-03-30 DIAGNOSIS — Z23 ENCOUNTER FOR IMMUNIZATION: ICD-10-CM

## 2021-04-02 ENCOUNTER — IMMUNIZATIONS (OUTPATIENT)
Dept: FAMILY MEDICINE CLINIC | Facility: HOSPITAL | Age: 51
End: 2021-04-02

## 2021-04-02 DIAGNOSIS — Z23 ENCOUNTER FOR IMMUNIZATION: Primary | ICD-10-CM

## 2021-04-02 PROCEDURE — 0001A SARS-COV-2 / COVID-19 MRNA VACCINE (PFIZER-BIONTECH) 30 MCG: CPT

## 2021-04-02 PROCEDURE — 91300 SARS-COV-2 / COVID-19 MRNA VACCINE (PFIZER-BIONTECH) 30 MCG: CPT

## 2021-04-26 ENCOUNTER — IMMUNIZATIONS (OUTPATIENT)
Dept: FAMILY MEDICINE CLINIC | Facility: HOSPITAL | Age: 51
End: 2021-04-26

## 2021-04-26 DIAGNOSIS — Z23 ENCOUNTER FOR IMMUNIZATION: Primary | ICD-10-CM

## 2021-04-26 PROCEDURE — 91300 SARS-COV-2 / COVID-19 MRNA VACCINE (PFIZER-BIONTECH) 30 MCG: CPT

## 2021-04-26 PROCEDURE — 0002A SARS-COV-2 / COVID-19 MRNA VACCINE (PFIZER-BIONTECH) 30 MCG: CPT

## 2021-08-10 ENCOUNTER — HOSPITAL ENCOUNTER (OUTPATIENT)
Dept: RADIOLOGY | Facility: HOSPITAL | Age: 51
Discharge: HOME | End: 2021-08-10
Payer: COMMERCIAL

## 2021-08-10 DIAGNOSIS — R52 PAIN: ICD-10-CM

## 2021-08-10 PROCEDURE — G1004 CDSM NDSC: HCPCS

## 2021-08-10 PROCEDURE — 73721 MRI JNT OF LWR EXTRE W/O DYE: CPT

## 2021-09-15 ENCOUNTER — ANNUAL EXAM (OUTPATIENT)
Dept: OBGYN CLINIC | Facility: CLINIC | Age: 51
End: 2021-09-15
Payer: COMMERCIAL

## 2021-09-15 VITALS
WEIGHT: 163 LBS | SYSTOLIC BLOOD PRESSURE: 120 MMHG | HEIGHT: 69 IN | DIASTOLIC BLOOD PRESSURE: 84 MMHG | BODY MASS INDEX: 24.14 KG/M2

## 2021-09-15 DIAGNOSIS — Z01.419 WOMEN'S ANNUAL ROUTINE GYNECOLOGICAL EXAMINATION: Primary | ICD-10-CM

## 2021-09-15 DIAGNOSIS — Z12.31 ENCOUNTER FOR SCREENING MAMMOGRAM FOR MALIGNANT NEOPLASM OF BREAST: ICD-10-CM

## 2021-09-15 PROCEDURE — S0612 ANNUAL GYNECOLOGICAL EXAMINA: HCPCS | Performed by: OBSTETRICS & GYNECOLOGY

## 2021-09-15 NOTE — PROGRESS NOTES
Assessment/Plan:      the patient was informed of a stable gyn examination  A Pap smear was performed  We had a discussion about the approaching menopause she will continue watch her cycles  Her IUD was placed in   Because the new recommendations this can be into the year   She is content with her weight she sees a dentist on a regular basis  She has seen COVID vaccine  She should return my office in 1 year unless other issues or menopause symptoms occur  Subjective:      Patient ID: Gina Alvarado is a 46 y o  female  HPI    This is a 25-year-old white female, she is a  4 para 2 with 1 vaginal birth and 1  section  Her current method of contraception includes the Mirena IUD  This is placed in the year 2017  Because the recent recommendations this will consist IM placed to the year   She continues to lose weight is status post weight loss surgery she is doing well  She is now in a relationship for over year this is going well  She is very happy with her sex life and intimacy  Denies any major  GI complaint  She feels safe at home  She has received COVID vaccine  There are no new major family illnesses report this time  She has a dentist on a regular basis  She will continue to get her yearly mammograms  She denies any problem with depression or anxiety  The following portions of the patient's history were reviewed and updated as appropriate: allergies, current medications, past family history, past medical history, past social history, past surgical history and problem list     Review of Systems   All other systems reviewed and are negative  Objective:      /84   Ht 5' 9" (1 753 m)   Wt 73 9 kg (163 lb)   LMP 2021   BMI 24 07 kg/m²          Physical Exam  Vitals reviewed  Exam conducted with a chaperone present  Constitutional:       Appearance: Normal appearance  She is normal weight  HENT:      Head: Normocephalic and atraumatic  Eyes:      Extraocular Movements: Extraocular movements intact  Cardiovascular:      Rate and Rhythm: Normal rate and regular rhythm  Pulses: Normal pulses  Heart sounds: Normal heart sounds  Pulmonary:      Effort: Pulmonary effort is normal       Breath sounds: Normal breath sounds  Chest:      Breasts:         Right: Normal          Left: Normal    Abdominal:      General: Abdomen is flat  A surgical scar is present  Bowel sounds are normal  There is no distension  Palpations: Abdomen is soft  There is no hepatomegaly or splenomegaly  Tenderness: There is no abdominal tenderness  Hernia: No hernia is present  There is no hernia in the umbilical area, ventral area, left inguinal area, right femoral area, left femoral area or right inguinal area  Comments:   2 midline incision scars, the once a periods from her history of gastric bypass surgery approximately 13 years ago  The lower incision is from her  section scar which was a midline surgery  Both scars are well-healed   Genitourinary:     General: Normal vulva  Pubic Area: No rash or pubic lice  Labia:         Right: No rash, tenderness, lesion or injury  Left: No rash, tenderness, lesion or injury  Urethra: No prolapse, urethral pain, urethral swelling or urethral lesion  Vagina: Normal  No signs of injury and foreign body  No vaginal discharge, erythema, tenderness, bleeding, lesions or prolapsed vaginal walls  Cervix: Normal       Uterus: Normal  Not deviated, not enlarged, not fixed, not tender and no uterine prolapse  Adnexa: Right adnexa normal and left adnexa normal         Right: No mass, tenderness or fullness  Left: No mass, tenderness or fullness  Rectum: Normal       Comments: The external genitalia normal limits the vagina is clean the cervix is parous but is small  Uterus is retroverted normal size is no cervical motion tenderness    The adnexa clear bilaterally  There is no prolapse of the bladder or the urethra  A Pap smear was performed  Musculoskeletal:         General: Normal range of motion  Cervical back: Normal range of motion and neck supple  Lymphadenopathy:      Upper Body:      Right upper body: No supraclavicular or axillary adenopathy  Left upper body: No supraclavicular or axillary adenopathy  Lower Body: No right inguinal adenopathy  No left inguinal adenopathy  Skin:     General: Skin is warm  Neurological:      General: No focal deficit present  Mental Status: She is alert and oriented to person, place, and time  Psychiatric:         Mood and Affect: Mood normal          Behavior: Behavior normal          Thought Content:  Thought content normal

## 2021-09-15 NOTE — PATIENT INSTRUCTIONS
Patient was informed of a stable gyn examination  There is no signs of menopause the present time  Her IUD is doing well the should be removed in the year 2024  A Pap smear was performed  Return my office in 1 year unless new problems arise

## 2021-09-28 NOTE — PROGRESS NOTES
PT Evaluation     Today's date: 2021  Patient name: Bertrand Meza  : 1970  MRN: [id number]  Referring provider: Yao Rai DO  Dx:   Encounter Diagnosis     ICD-10-CM    1  Left hip pain  M25 552    2  Tear of left acetabular labrum, subsequent encounter  S73 192D                   Assessment  Assessment details: Bertrand Meza is a 46 y o  female who presents with signs and symptoms consistent with left hip labral tear  She demonstrates mild hip weakness, decreased flexibility, antalgic gait, decreased activity tolerance, and decreased function  Patient would benefit from skilled physical therapy in order to address said deficits and improve functional mobility  Impairments: abnormal gait, abnormal movement, activity intolerance, impaired balance, impaired physical strength, lacks appropriate home exercise program, pain with function, weight-bearing intolerance and poor posture   Understanding of Dx/Px/POC: good   Prognosis: good    Goals  STG:   decrease pain 1 grade  Independent with HEP    LTG:  decrease pain 2 grades  Increase FOTO to expected score  Get in/out of car without difficulty     Plan  Plan details: 1-2 visits a week due to high copays   Patient would benefit from: skilled physical therapy  Planned therapy interventions: joint mobilization, manual therapy, abdominal trunk stabilization, massage, activity modification, neuromuscular re-education, patient education, balance, stretching, strengthening, therapeutic activities, therapeutic exercise, functional ROM exercises, flexibility and home exercise program  Frequency: 2x week  Duration in weeks: 6  Treatment plan discussed with: patient        Subjective Evaluation    History of Present Illness  Mechanism of injury: Here for concerns today  For about 1 5 months has had left groin pain  Seems to be worse if changing positions or with walking, pain lifting leg  She performs stairs reciprocally   She has increased pain getting in and out of car  Denies catching, locking, or giving way of leg  Pain into groin in left leg occasionally into right groin and aroud left buttock  Pain is a severe shooting pain only about 2 times per week but the dull pain is constant  No lower back pain and denies injury to low back  Denies n/t into leg  Patient can still golf, has not been able to play softball  Walks ~ 10 mils a day for work and also works as  part time  Pain  Current pain ratin  At worst pain ratin      Diagnostic Tests    FCE comments: Xray and MRI -she says show labral tearPatient Goals  Patient goals for therapy: decreased pain and return to sport/leisure activities          Objective     Active Range of Motion   Left Hip   Flexion: 110 (pain returning to neutral ) degrees with pain  Abduction: 42 degrees   External rotation (90/90): 42 degrees   Internal rotation (90/90): 35 degrees with pain    Right Hip   Flexion: 107 degrees   Abduction: 44 degrees   External rotation (90/90): 43 degrees   Internal rotation (90/90): 37 degrees     Strength/Myotome Testing     Left Hip   Planes of Motion   Flexion: 4-  Extension: 4  Abduction: 4+  Adduction: 4  External rotation: 5  Internal rotation: 5    Right Hip   Planes of Motion   Flexion: 4  Extension: 5  External rotation: 5  Internal rotation: 5    Additional Strength Details  Moderate quad tightness bl L>R  Min hamstring tightness milagros with pain and cracking in b/l hips     Right side bend (left side glide) feel pulling into left groin-all other l/s movements ruled out L/S pathology  Tests     Left Hip   Positive MELVIN, FADIR and scour                Precautions: None   ~ 4 visits only- update HEP each visit    Manuals                                                                 Neuro Re-Ed             Step ups to foam             SLS NV            Ball toss to rebounder SLS                                                                 Ther Ex             Bike/TM NV standing hip flexion loop NV            Standing hip abd loop NV            Leg press with band  NV                         Side step  G 2x10            Clam shells   G 2x10            Bridge with bad  G 2x10  With PB   Single leg bridge                      Ther Activity             Step up and overs             Sit to stands from  pistol   NV          Reverse slider  lunges    With foams          Lateral slider lunges              BOSU squats              Lateral step ups  NV            Gait Training

## 2021-09-29 ENCOUNTER — EVALUATION (OUTPATIENT)
Dept: PHYSICAL THERAPY | Facility: CLINIC | Age: 51
End: 2021-09-29
Payer: COMMERCIAL

## 2021-09-29 DIAGNOSIS — S73.192D TEAR OF LEFT ACETABULAR LABRUM, SUBSEQUENT ENCOUNTER: ICD-10-CM

## 2021-09-29 DIAGNOSIS — M25.552 LEFT HIP PAIN: Primary | ICD-10-CM

## 2021-09-29 PROCEDURE — 97530 THERAPEUTIC ACTIVITIES: CPT | Performed by: PHYSICAL THERAPIST

## 2021-09-29 PROCEDURE — 97110 THERAPEUTIC EXERCISES: CPT | Performed by: PHYSICAL THERAPIST

## 2021-09-29 PROCEDURE — 97161 PT EVAL LOW COMPLEX 20 MIN: CPT | Performed by: PHYSICAL THERAPIST

## 2021-10-07 ENCOUNTER — APPOINTMENT (OUTPATIENT)
Dept: PHYSICAL THERAPY | Facility: CLINIC | Age: 51
End: 2021-10-07
Payer: COMMERCIAL

## 2021-10-14 ENCOUNTER — OFFICE VISIT (OUTPATIENT)
Dept: PHYSICAL THERAPY | Facility: CLINIC | Age: 51
End: 2021-10-14
Payer: COMMERCIAL

## 2021-10-14 DIAGNOSIS — M25.552 LEFT HIP PAIN: Primary | ICD-10-CM

## 2021-10-14 DIAGNOSIS — S73.192D TEAR OF LEFT ACETABULAR LABRUM, SUBSEQUENT ENCOUNTER: ICD-10-CM

## 2021-10-14 PROCEDURE — 97110 THERAPEUTIC EXERCISES: CPT | Performed by: PHYSICAL THERAPIST

## 2021-10-14 PROCEDURE — 97530 THERAPEUTIC ACTIVITIES: CPT | Performed by: PHYSICAL THERAPIST

## 2021-10-21 ENCOUNTER — TELEPHONE (OUTPATIENT)
Dept: PHYSICAL THERAPY | Facility: CLINIC | Age: 51
End: 2021-10-21

## 2021-10-27 ENCOUNTER — HOSPITAL ENCOUNTER (OUTPATIENT)
Dept: RADIOLOGY | Age: 51
Discharge: HOME | End: 2021-10-27
Payer: COMMERCIAL

## 2021-10-27 VITALS — WEIGHT: 163 LBS | HEIGHT: 69 IN | BODY MASS INDEX: 24.14 KG/M2

## 2021-10-27 DIAGNOSIS — Z12.31 ENCOUNTER FOR SCREENING MAMMOGRAM FOR MALIGNANT NEOPLASM OF BREAST: ICD-10-CM

## 2021-10-27 PROCEDURE — 77067 SCR MAMMO BI INCL CAD: CPT

## 2021-10-27 PROCEDURE — 77063 BREAST TOMOSYNTHESIS BI: CPT

## 2021-11-05 LAB
ALBUMIN SERPL BCP-MCNC: 3.9 G/DL (ref 3.5–5)
ALP SERPL-CCNC: 44 U/L (ref 46–116)
ALT SERPL W P-5'-P-CCNC: 16 U/L (ref 12–78)
ANION GAP SERPL CALCULATED.3IONS-SCNC: 7 MMOL/L (ref 4–13)
AST SERPL W P-5'-P-CCNC: 14 U/L (ref 5–45)
BASOPHILS # BLD AUTO: 0.04 THOUSANDS/ΜL (ref 0–0.1)
BASOPHILS NFR BLD AUTO: 1 % (ref 0–1)
BILIRUB SERPL-MCNC: 0.53 MG/DL (ref 0.2–1)
BUN SERPL-MCNC: 10 MG/DL (ref 5–25)
CALCIUM SERPL-MCNC: 9 MG/DL (ref 8.3–10.1)
CHLORIDE SERPL-SCNC: 104 MMOL/L (ref 100–108)
CO2 SERPL-SCNC: 28 MMOL/L (ref 21–32)
CREAT SERPL-MCNC: 0.98 MG/DL (ref 0.6–1.3)
EOSINOPHIL # BLD AUTO: 0.11 THOUSAND/ΜL (ref 0–0.61)
EOSINOPHIL NFR BLD AUTO: 1 % (ref 0–6)
ERYTHROCYTE [DISTWIDTH] IN BLOOD BY AUTOMATED COUNT: 14.4 % (ref 11.6–15.1)
GFR SERPL CREATININE-BSD FRML MDRD: 66 ML/MIN/1.73SQ M
GLUCOSE SERPL-MCNC: 115 MG/DL (ref 65–140)
HCT VFR BLD AUTO: 41.7 % (ref 34.8–46.1)
HGB BLD-MCNC: 13.5 G/DL (ref 11.5–15.4)
HOLD SPECIMEN: NORMAL
IMM GRANULOCYTES # BLD AUTO: 0.03 THOUSAND/UL (ref 0–0.2)
IMM GRANULOCYTES NFR BLD AUTO: 0 % (ref 0–2)
LIPASE SERPL-CCNC: 87 U/L (ref 73–393)
LYMPHOCYTES # BLD AUTO: 1.58 THOUSANDS/ΜL (ref 0.6–4.47)
LYMPHOCYTES NFR BLD AUTO: 18 % (ref 14–44)
MCH RBC QN AUTO: 28.5 PG (ref 26.8–34.3)
MCHC RBC AUTO-ENTMCNC: 32.4 G/DL (ref 31.4–37.4)
MCV RBC AUTO: 88 FL (ref 82–98)
MONOCYTES # BLD AUTO: 0.65 THOUSAND/ΜL (ref 0.17–1.22)
MONOCYTES NFR BLD AUTO: 8 % (ref 4–12)
NEUTROPHILS # BLD AUTO: 6.19 THOUSANDS/ΜL (ref 1.85–7.62)
NEUTS SEG NFR BLD AUTO: 72 % (ref 43–75)
NRBC BLD AUTO-RTO: 0 /100 WBCS
PLATELET # BLD AUTO: 293 THOUSANDS/UL (ref 149–390)
PMV BLD AUTO: 10.4 FL (ref 8.9–12.7)
POTASSIUM SERPL-SCNC: 3.9 MMOL/L (ref 3.5–5.3)
PROT SERPL-MCNC: 7.6 G/DL (ref 6.4–8.2)
RBC # BLD AUTO: 4.74 MILLION/UL (ref 3.81–5.12)
SODIUM SERPL-SCNC: 139 MMOL/L (ref 136–145)
WBC # BLD AUTO: 8.6 THOUSAND/UL (ref 4.31–10.16)

## 2021-11-05 PROCEDURE — 99284 EMERGENCY DEPT VISIT MOD MDM: CPT

## 2021-11-05 PROCEDURE — 86618 LYME DISEASE ANTIBODY: CPT

## 2021-11-05 PROCEDURE — 84443 ASSAY THYROID STIM HORMONE: CPT

## 2021-11-05 PROCEDURE — 83690 ASSAY OF LIPASE: CPT | Performed by: EMERGENCY MEDICINE

## 2021-11-05 PROCEDURE — 82550 ASSAY OF CK (CPK): CPT

## 2021-11-05 PROCEDURE — 85025 COMPLETE CBC W/AUTO DIFF WBC: CPT | Performed by: EMERGENCY MEDICINE

## 2021-11-05 PROCEDURE — 80053 COMPREHEN METABOLIC PANEL: CPT | Performed by: EMERGENCY MEDICINE

## 2021-11-05 PROCEDURE — 36415 COLL VENOUS BLD VENIPUNCTURE: CPT

## 2021-11-06 ENCOUNTER — HOSPITAL ENCOUNTER (EMERGENCY)
Facility: HOSPITAL | Age: 51
Discharge: HOME | End: 2021-11-06
Attending: EMERGENCY MEDICINE | Admitting: EMERGENCY MEDICINE
Payer: COMMERCIAL

## 2021-11-06 ENCOUNTER — APPOINTMENT (EMERGENCY)
Dept: RADIOLOGY | Facility: HOSPITAL | Age: 51
End: 2021-11-06
Payer: COMMERCIAL

## 2021-11-06 VITALS
TEMPERATURE: 97.4 F | SYSTOLIC BLOOD PRESSURE: 95 MMHG | HEART RATE: 68 BPM | RESPIRATION RATE: 18 BRPM | DIASTOLIC BLOOD PRESSURE: 61 MMHG | OXYGEN SATURATION: 99 %

## 2021-11-06 DIAGNOSIS — R68.84 JAW PAIN: ICD-10-CM

## 2021-11-06 DIAGNOSIS — R11.0 NAUSEA: ICD-10-CM

## 2021-11-06 DIAGNOSIS — M79.661 PAIN OF RIGHT LOWER LEG: Primary | ICD-10-CM

## 2021-11-06 DIAGNOSIS — M21.379 FOOT DROP: ICD-10-CM

## 2021-11-06 LAB
CK SERPL-CCNC: 48 U/L (ref 26–192)
TSH SERPL DL<=0.05 MIU/L-ACNC: 3.12 UIU/ML (ref 0.36–3.74)

## 2021-11-06 PROCEDURE — 73610 X-RAY EXAM OF ANKLE: CPT

## 2021-11-06 PROCEDURE — 96361 HYDRATE IV INFUSION ADD-ON: CPT

## 2021-11-06 PROCEDURE — 29515 APPLICATION SHORT LEG SPLINT: CPT | Performed by: EMERGENCY MEDICINE

## 2021-11-06 PROCEDURE — 96376 TX/PRO/DX INJ SAME DRUG ADON: CPT

## 2021-11-06 PROCEDURE — 99285 EMERGENCY DEPT VISIT HI MDM: CPT | Performed by: EMERGENCY MEDICINE

## 2021-11-06 PROCEDURE — 96375 TX/PRO/DX INJ NEW DRUG ADDON: CPT

## 2021-11-06 PROCEDURE — 96374 THER/PROPH/DIAG INJ IV PUSH: CPT

## 2021-11-06 RX ORDER — ONDANSETRON 4 MG/1
4 TABLET, ORALLY DISINTEGRATING ORAL ONCE
Status: DISCONTINUED | OUTPATIENT
Start: 2021-11-06 | End: 2021-11-06

## 2021-11-06 RX ORDER — ONDANSETRON 4 MG/1
4 TABLET, ORALLY DISINTEGRATING ORAL EVERY 8 HOURS PRN
Qty: 15 TABLET | Refills: 0 | Status: SHIPPED | OUTPATIENT
Start: 2021-11-06 | End: 2022-04-15

## 2021-11-06 RX ORDER — ONDANSETRON 2 MG/ML
4 INJECTION INTRAMUSCULAR; INTRAVENOUS ONCE
Status: COMPLETED | OUTPATIENT
Start: 2021-11-06 | End: 2021-11-06

## 2021-11-06 RX ORDER — HYDROCODONE BITARTRATE AND ACETAMINOPHEN 5; 325 MG/1; MG/1
1 TABLET ORAL EVERY 6 HOURS PRN
Qty: 20 TABLET | Refills: 0 | Status: SHIPPED | OUTPATIENT
Start: 2021-11-06 | End: 2021-11-11

## 2021-11-06 RX ORDER — HYDROMORPHONE HCL/PF 1 MG/ML
0.5 SYRINGE (ML) INJECTION ONCE
Status: COMPLETED | OUTPATIENT
Start: 2021-11-06 | End: 2021-11-06

## 2021-11-06 RX ADMIN — HYDROMORPHONE HYDROCHLORIDE 0.5 MG: 1 INJECTION, SOLUTION INTRAMUSCULAR; INTRAVENOUS; SUBCUTANEOUS at 02:14

## 2021-11-10 LAB — B BURGDOR IGG+IGM SER-ACNC: 22

## 2021-11-16 ENCOUNTER — HOSPITAL ENCOUNTER (OUTPATIENT)
Dept: MAMMOGRAPHY | Facility: CLINIC | Age: 51
Discharge: HOME | End: 2021-11-16
Payer: COMMERCIAL

## 2021-11-16 ENCOUNTER — HOSPITAL ENCOUNTER (OUTPATIENT)
Dept: ULTRASOUND IMAGING | Facility: CLINIC | Age: 51
Discharge: HOME | End: 2021-11-16
Payer: COMMERCIAL

## 2021-11-16 VITALS — BODY MASS INDEX: 24.14 KG/M2 | HEIGHT: 69 IN | WEIGHT: 163 LBS

## 2021-11-16 DIAGNOSIS — R92.8 ABNORMAL SCREENING MAMMOGRAM: ICD-10-CM

## 2021-11-16 PROCEDURE — 77065 DX MAMMO INCL CAD UNI: CPT

## 2021-11-16 PROCEDURE — 76642 ULTRASOUND BREAST LIMITED: CPT

## 2021-11-16 PROCEDURE — G0279 TOMOSYNTHESIS, MAMMO: HCPCS

## 2021-11-23 ENCOUNTER — TELEPHONE (OUTPATIENT)
Dept: OBGYN CLINIC | Facility: CLINIC | Age: 51
End: 2021-11-23

## 2022-02-24 ENCOUNTER — CONSULT (OUTPATIENT)
Dept: GASTROENTEROLOGY | Facility: AMBULARY SURGERY CENTER | Age: 52
End: 2022-02-24
Payer: COMMERCIAL

## 2022-02-24 VITALS
BODY MASS INDEX: 22.28 KG/M2 | DIASTOLIC BLOOD PRESSURE: 70 MMHG | OXYGEN SATURATION: 98 % | WEIGHT: 150.4 LBS | SYSTOLIC BLOOD PRESSURE: 108 MMHG | HEIGHT: 69 IN | HEART RATE: 85 BPM

## 2022-02-24 DIAGNOSIS — Z12.11 ENCOUNTER FOR SCREENING COLONOSCOPY: ICD-10-CM

## 2022-02-24 PROCEDURE — 99204 OFFICE O/P NEW MOD 45 MIN: CPT | Performed by: INTERNAL MEDICINE

## 2022-02-24 RX ORDER — SODIUM PICOSULFATE, MAGNESIUM OXIDE, AND ANHYDROUS CITRIC ACID 10; 3.5; 12 MG/160ML; G/160ML; G/160ML
LIQUID ORAL
Qty: 320 ML | Refills: 0 | Status: SHIPPED | OUTPATIENT
Start: 2022-02-24

## 2022-02-24 NOTE — LETTER
February 25, 2022     July De La Cruz MD  [address]  [address]  [address] [place] [address]    Patient: Lyle Gabriel   YOB: 1970   Date of Visit: 2/24/2022       Dear Dr Janelle Mcginnis: Thank you for referring Leona Vo to me for evaluation  Below are my notes for this consultation  If you have questions, please do not hesitate to call me  I look forward to following your patient along with you  Sincerely,        Beryle Homme, MD        CC: Norman Joseph, Nasim Mccloud MD  2/25/2022  7:04 AM  Sign when Signing Visit  Consultation - [facility]  Lyle Gabriel 46 y o  female MRN: [id number]  Unit/Bed#:  Encounter: [id number]        Consults    ASSESSMENT/PLAN:     1  Colon cancer screening-average risk, no family history of colon cancer     -would recommend average risk screening colonoscopy at this time  -  Patient was explained about  the risks and benefits of the procedure  Risks including but not limited to bleeding, infection, perforation were explained in detail  Also explained about less than 100% sensitivity with the exam and other alternatives  -patient is not on any antiplatelets or anticoagulants        ______________________________________________________________________    Reason for Consult / Principal Problem: [unfilled]    HPI: Lyle Gabriel is a 46y o  year old female with history of gastric bypass, presents for colon cancer screening  Patient denies any change in bowel habits, hematochezia, abdominal pain or unintentional weight loss  No family history of colon cancer  No unintentional weight loss  Denies any upper GI symptoms including acid reflux, dysphagia, odynophagia, loss of appetite or early satiety  Labs are reviewed and are notable for hemoglobin of 13 5, platelets 673, normal liver enzymes, normal creatinine  Review of Systems:   The remainder of the review of systems was negative except for the pertinent positives noted in HPI  Historical Information   Past Medical History:   Diagnosis Date    Anemia     B12 deficiency     Basal cell carcinoma     Gastric bypass status for obesity     Shoulder bursitis      Past Surgical History:   Procedure Laterality Date     SECTION      GASTRIC BYPASS      for morbid obesity   Jonathon-en-Y     HEMORRHOID SURGERY       Social History   Social History     Substance and Sexual Activity   Alcohol Use Yes    Comment: rarely     Social History     Substance and Sexual Activity   Drug Use No     Social History     Tobacco Use   Smoking Status Never Smoker   Smokeless Tobacco Never Used     Family History   Problem Relation Age of Onset    Lung cancer Mother         was a smoker    Pancreatic cancer Father     No Known Problems Brother     Skin cancer Maternal Grandfather     No Known Problems Maternal Aunt     No Known Problems Maternal Aunt     No Known Problems Maternal Aunt     No Known Problems Maternal Aunt     No Known Problems Paternal Aunt     No Known Problems Paternal Aunt     No Known Problems Paternal Aunt     No Known Problems Paternal Aunt     No Known Problems Paternal Aunt     No Known Problems Maternal Grandmother     No Known Problems Paternal Grandmother     No Known Problems Paternal Grandfather     No Known Problems Son     No Known Problems Son        Meds/Allergies     (Not in a hospital admission)    Current Facility-Administered Medications   Medication Dose Route Frequency    cyanocobalamin injection 1,000 mcg  1,000 mcg Intramuscular Weekly       No Known Allergies    Objective     Blood pressure 108/70, pulse 85, height 5' 9" (1 753 m), weight 68 2 kg (150 lb 6 4 oz), SpO2 98 %, not currently breastfeeding      [unfilled]    PHYSICAL EXAM     GEN: well nourished, well developed, no acute distress  HEENT: anicteric, MMM, no cervical or supraclavicular lymphadenopathy  CV: RRR, no m/r/g  CHEST: CTA b/l, no WRR  ABD: +BS, soft, NT/ND, no hepatosplenomegaly  EXT: no c/c/e  SKIN: no rashes,  NEURO: aaox3    Lab Results:   No visits with results within 1 Day(s) from this visit     Latest known visit with results is:   Admission on 11/06/2021, Discharged on 11/06/2021   Component Date Value    WBC 11/05/2021 8 60     RBC 11/05/2021 4 74     Hemoglobin 11/05/2021 13 5     Hematocrit 11/05/2021 41 7     MCV 11/05/2021 88     MCH 11/05/2021 28 5     MCHC 11/05/2021 32 4     RDW 11/05/2021 14 4     MPV 11/05/2021 10 4     Platelets 44/71/1237 293     nRBC 11/05/2021 0     Neutrophils Relative 11/05/2021 72     Immat GRANS % 11/05/2021 0     Lymphocytes Relative 11/05/2021 18     Monocytes Relative 11/05/2021 8     Eosinophils Relative 11/05/2021 1     Basophils Relative 11/05/2021 1     Neutrophils Absolute 11/05/2021 6 19     Immature Grans Absolute 11/05/2021 0 03     Lymphocytes Absolute 11/05/2021 1 58     Monocytes Absolute 11/05/2021 0 65     Eosinophils Absolute 11/05/2021 0 11     Basophils Absolute 11/05/2021 0 04     Sodium 11/05/2021 139     Potassium 11/05/2021 3 9     Chloride 11/05/2021 104     CO2 11/05/2021 28     ANION GAP 11/05/2021 7     BUN 11/05/2021 10     Creatinine 11/05/2021 0 98     Glucose 11/05/2021 115     Calcium 11/05/2021 9 0     AST 11/05/2021 14     ALT 11/05/2021 16     Alkaline Phosphatase 11/05/2021 44*    Total Protein 11/05/2021 7 6     Albumin 11/05/2021 3 9     Total Bilirubin 11/05/2021 0 53     eGFR 11/05/2021 66     Lipase 11/05/2021 87     Extra Tube 11/05/2021 on hold     Total CK 11/05/2021 48     TSH 3RD GENERATON 11/05/2021 3 119     Lyme total antibody 11/05/2021 22      Imaging Studies: I have personally reviewed pertinent films in PACS

## 2022-02-25 NOTE — PROGRESS NOTES
Consultation - [facility]  Eduin Leija 46 y o  female MRN: [id number]  Unit/Bed#:  Encounter: [id number]        Consults    ASSESSMENT/PLAN:     1  Colon cancer screening-average risk, no family history of colon cancer     -would recommend average risk screening colonoscopy at this time  -  Patient was explained about  the risks and benefits of the procedure  Risks including but not limited to bleeding, infection, perforation were explained in detail  Also explained about less than 100% sensitivity with the exam and other alternatives  -patient is not on any antiplatelets or anticoagulants        ______________________________________________________________________    Reason for Consult / Principal Problem: [unfilled]    HPI: Eduin Leija is a 46y o  year old female with history of gastric bypass, presents for colon cancer screening  Patient denies any change in bowel habits, hematochezia, abdominal pain or unintentional weight loss  No family history of colon cancer  No unintentional weight loss  Denies any upper GI symptoms including acid reflux, dysphagia, odynophagia, loss of appetite or early satiety  Labs are reviewed and are notable for hemoglobin of 13 5, platelets 475, normal liver enzymes, normal creatinine  Review of Systems: The remainder of the review of systems was negative except for the pertinent positives noted in HPI       Historical Information   Past Medical History:   Diagnosis Date    Anemia     B12 deficiency     Basal cell carcinoma     Gastric bypass status for obesity     Shoulder bursitis      Past Surgical History:   Procedure Laterality Date     SECTION      GASTRIC BYPASS      for morbid obesity   Jonathon-en-Y     HEMORRHOID SURGERY       Social History   Social History     Substance and Sexual Activity   Alcohol Use Yes    Comment: rarely     Social History     Substance and Sexual Activity   Drug Use No Social History     Tobacco Use   Smoking Status Never Smoker   Smokeless Tobacco Never Used     Family History   Problem Relation Age of Onset    Lung cancer Mother         was a smoker    Pancreatic cancer Father     No Known Problems Brother     Skin cancer Maternal Grandfather     No Known Problems Maternal Aunt     No Known Problems Maternal Aunt     No Known Problems Maternal Aunt     No Known Problems Maternal Aunt     No Known Problems Paternal Aunt     No Known Problems Paternal Aunt     No Known Problems Paternal Aunt     No Known Problems Paternal Aunt     No Known Problems Paternal Aunt     No Known Problems Maternal Grandmother     No Known Problems Paternal Grandmother     No Known Problems Paternal Grandfather     No Known Problems Son     No Known Problems Son        Meds/Allergies     (Not in a hospital admission)    Current Facility-Administered Medications   Medication Dose Route Frequency    cyanocobalamin injection 1,000 mcg  1,000 mcg Intramuscular Weekly       No Known Allergies    Objective     Blood pressure 108/70, pulse 85, height 5' 9" (1 753 m), weight 68 2 kg (150 lb 6 4 oz), SpO2 98 %, not currently breastfeeding  [unfilled]    PHYSICAL EXAM     GEN: well nourished, well developed, no acute distress  HEENT: anicteric, MMM, no cervical or supraclavicular lymphadenopathy  CV: RRR, no m/r/g  CHEST: CTA b/l, no WRR  ABD: +BS, soft, NT/ND, no hepatosplenomegaly  EXT: no c/c/e  SKIN: no rashes,  NEURO: aaox3    Lab Results:   No visits with results within 1 Day(s) from this visit     Latest known visit with results is:   Admission on 11/06/2021, Discharged on 11/06/2021   Component Date Value    WBC 11/05/2021 8 60     RBC 11/05/2021 4 74     Hemoglobin 11/05/2021 13 5     Hematocrit 11/05/2021 41 7     MCV 11/05/2021 88     MCH 11/05/2021 28 5     MCHC 11/05/2021 32 4     RDW 11/05/2021 14 4     MPV 11/05/2021 10 4     Platelets 00/13/4719 293     nRBC 11/05/2021 0     Neutrophils Relative 11/05/2021 72     Immat GRANS % 11/05/2021 0     Lymphocytes Relative 11/05/2021 18     Monocytes Relative 11/05/2021 8     Eosinophils Relative 11/05/2021 1     Basophils Relative 11/05/2021 1     Neutrophils Absolute 11/05/2021 6 19     Immature Grans Absolute 11/05/2021 0 03     Lymphocytes Absolute 11/05/2021 1 58     Monocytes Absolute 11/05/2021 0 65     Eosinophils Absolute 11/05/2021 0 11     Basophils Absolute 11/05/2021 0 04     Sodium 11/05/2021 139     Potassium 11/05/2021 3 9     Chloride 11/05/2021 104     CO2 11/05/2021 28     ANION GAP 11/05/2021 7     BUN 11/05/2021 10     Creatinine 11/05/2021 0 98     Glucose 11/05/2021 115     Calcium 11/05/2021 9 0     AST 11/05/2021 14     ALT 11/05/2021 16     Alkaline Phosphatase 11/05/2021 44*    Total Protein 11/05/2021 7 6     Albumin 11/05/2021 3 9     Total Bilirubin 11/05/2021 0 53     eGFR 11/05/2021 66     Lipase 11/05/2021 87     Extra Tube 11/05/2021 on hold     Total CK 11/05/2021 48     TSH 3RD GENERATON 11/05/2021 3 119     Lyme total antibody 11/05/2021 22      Imaging Studies: I have personally reviewed pertinent films in PACS

## 2022-02-25 NOTE — PATIENT INSTRUCTIONS
Scheduled date of colonoscopy (as of today):4/20/2022  Physician performing colonoscopy:DR MARTINEZ  Location of colonoscopy:[facility]  Bowel prep reviewed with patient:KELY PER DR GARCIAS  Instructions reviewed with patient by:SONDRA MENDOZA  Clearances:

## 2022-04-14 ENCOUNTER — TELEPHONE (OUTPATIENT)
Dept: PREADMISSION TESTING | Facility: HOSPITAL | Age: 52
End: 2022-04-14

## 2022-04-15 VITALS — BODY MASS INDEX: 23.25 KG/M2 | HEIGHT: 69 IN | WEIGHT: 157 LBS

## 2022-04-15 NOTE — PRE-PROCEDURE INSTRUCTIONS
Pre-Surgery Instructions:   Medication Instructions    multivitamin (THERAGRAN) TABS Hold day of surgery   Sod Picosulfate-Mag Ox-Cit Acd (Clenpiq) 10-3 5-12 MG-GM -GM/160ML SOLN Take per Dr Valarie Plummer instructions    topiramate (TOPAMAX) 50 MG tablet Hold day of surgery   VITAMIN D PO Hold day of surgery  Pt to follow Dr Valarie Plummer instructions  Pt to arrive at the [facility] at the given time    Lonsakina Domo [phone]

## 2023-03-20 ENCOUNTER — PREP FOR PROCEDURE (OUTPATIENT)
Dept: GASTROENTEROLOGY | Facility: CLINIC | Age: 53
End: 2023-03-20

## 2023-03-20 ENCOUNTER — TELEPHONE (OUTPATIENT)
Dept: GASTROENTEROLOGY | Facility: CLINIC | Age: 53
End: 2023-03-20

## 2023-03-20 DIAGNOSIS — Z12.11 SCREENING FOR COLON CANCER: Primary | ICD-10-CM

## 2023-03-20 NOTE — TELEPHONE ENCOUNTER
03/20/23  Screened by: Padmini Glover    Referring Provider     Pre- Screening: There is no height or weight on file to calculate BMI  Has patient been referred for a routine screening Colonoscopy? yes  Is the patient between 39-70 years old? yes      Previous Colonoscopy yes   If yes:    Date: 10 years    Facility:     Reason:       SCHEDULING STAFF: If the patient is between 45yrs-49yrs, please advise patient to confirm benefits/coverage with their insurance company for a routine screening colonoscopy, some insurance carriers will only cover at [facility] or older  If the patient is over 66years old, please schedule an office visit  Does the patient want to see a Gastroenterologist prior to their procedure OR are they having any GI symptoms? no    Has the patient been hospitalized or had abdominal surgery in the past 6 months? no    Does the patient use supplemental oxygen? no    Does the patient take Coumadin, Lovenox, Plavix, Elliquis, Xarelto, or other blood thinning medication? no    Has the patient had a stroke, cardiac event, or stent placed in the past year? no    SCHEDULING STAFF: If patient answers NO to above questions, then schedule procedure  If patient answers YES to above questions, then schedule office appointment  Pt passed OA      If patient is between 45yrs - 49yrs, please advise patient that we will have to confirm benefits & coverage with their insurance company for a routine screening colonoscopy

## 2023-03-20 NOTE — TELEPHONE ENCOUNTER
Scheduled date of colonoscopy (as of today):4/27/23    Physician performing colonoscopy:Dr Zhang    Location of colonoscopy: [facility]    Clearances: N/A    Pt had gastric bypass about 12 years ago

## 2023-03-28 ENCOUNTER — OFFICE VISIT (OUTPATIENT)
Dept: OBGYN CLINIC | Facility: CLINIC | Age: 53
End: 2023-03-28

## 2023-03-28 VITALS
SYSTOLIC BLOOD PRESSURE: 112 MMHG | DIASTOLIC BLOOD PRESSURE: 82 MMHG | BODY MASS INDEX: 25.06 KG/M2 | WEIGHT: 169.2 LBS | HEIGHT: 69 IN

## 2023-03-28 DIAGNOSIS — Z01.419 WOMEN'S ANNUAL ROUTINE GYNECOLOGICAL EXAMINATION: ICD-10-CM

## 2023-03-28 DIAGNOSIS — Z78.0 MENOPAUSE: Primary | ICD-10-CM

## 2023-03-28 NOTE — PROGRESS NOTES
Assessment/Plan:    The patient was informed of a stable perimenopausal examination  Her current method of contraception includes an IUD  The IUD string could not be visualized  Transabdominal ultrasound show that the IUD was present without evidence of perforation  We will check a serum FSH and LH if these elevated which I suspect will make arrangements for removal of the IUD  She will continue to get yearly mammograms per her colonoscopy is up-to-date  She feels safe at home  She is content with her weight  She is no problem with sexuality  Denies any prior depression or anxiety  She continues to dentist on a regular basis  She should return to my office in 1 year after we remove the IUD  Subjective:      Patient ID: Laurita Staley is a 45 y o  female  HPI  This is a 69-year-old white female, she is a  4 para 2 with 1  section and 1 vaginal delivery  Her current method of contraception includes the IUD  She is happy with that  Her last menstrual cycle was in 2021  She does have hot flashes but no vaginal dryness  We will check serum FSH and LH to see if she is menopausal   If she is menopausal we will remove the IUD  She denies any major  or GI complaint  Her next colonoscopy is scheduled for April of this year  She is approximate 14 years status post gastric bypass surgery she is happy with results is kept her weight off  She is content at home she sees a dentist on a regular basis  Denies a prior depression or anxiety  There is no problem with intimacy  She is happy with her IUD  There are no new major family illnesses to report          The following portions of the patient's history were reviewed and updated as appropriate: allergies, current medications, past family history, past medical history, past social history, past surgical history and problem list     Review of Systems   Genitourinary:        Positive for hot flashes   All other systems reviewed "and are negative  Objective:      /82   Ht 5' 9\" (1 753 m)   Wt 76 7 kg (169 lb 3 2 oz)   BMI 24 99 kg/m²          Physical Exam  Vitals reviewed  Exam conducted with a chaperone present  Constitutional:       Appearance: Normal appearance  She is normal weight  HENT:      Head: Normocephalic and atraumatic  Mouth/Throat:      Mouth: Mucous membranes are moist    Eyes:      Extraocular Movements: Extraocular movements intact  Conjunctiva/sclera: Conjunctivae normal       Pupils: Pupils are equal, round, and reactive to light  Cardiovascular:      Rate and Rhythm: Normal rate and regular rhythm  Pulses: Normal pulses  Pulmonary:      Effort: Pulmonary effort is normal       Breath sounds: Normal breath sounds  Chest:   Breasts:     Breasts are symmetrical       Right: Normal  No swelling, bleeding, inverted nipple, mass or nipple discharge  Left: Normal  No swelling, bleeding, inverted nipple, mass or nipple discharge  Abdominal:      General: Abdomen is flat  Bowel sounds are normal  There is no distension  Palpations: Abdomen is soft  There is no splenomegaly or mass  Tenderness: There is no abdominal tenderness  Hernia: No hernia is present  There is no hernia in the left inguinal area or right inguinal area  Comments: Patient is to midline incision is the upper 1 is from gastric bypass surgery over 14 years ago  The lower 1 is from her  section over 30 years ago   Genitourinary:     General: Normal vulva  Pubic Area: No rash or pubic lice  Labia:         Right: No rash, tenderness, lesion or injury  Left: No rash, tenderness, lesion or injury  Vagina: Normal  No signs of injury and foreign body  No vaginal discharge, erythema, tenderness, bleeding, lesions or prolapsed vaginal walls  Cervix: Normal       Rectum: Normal       Comments: The external genitalia normal limits the vagina is clean    The cervix " is closed uterus is anterior normal size there is no cervical motion tenderness  There is a slight rectocele present but not causing any problems  The urethra and bladder normal working relationship  A Pap smear was performed  Musculoskeletal:         General: Normal range of motion  Cervical back: Normal range of motion and neck supple  Lymphadenopathy:      Upper Body:      Right upper body: No supraclavicular or axillary adenopathy  Left upper body: No supraclavicular or axillary adenopathy  Skin:     General: Skin is warm and dry  Neurological:      General: No focal deficit present  Mental Status: She is alert and oriented to person, place, and time  Psychiatric:         Mood and Affect: Mood normal          Behavior: Behavior normal          Thought Content:  Thought content normal

## 2023-03-28 NOTE — PATIENT INSTRUCTIONS
The patient was informed of a stable perimenopausal GYN examination  We will check a serum FSH and LH  If these values are elevated consistent with menopause we will make arrangements for removal of the IUD  The IUD string could not be seen  Transabdominal ultrasound showed the IUD was in the endometrial cavity without evidence of perforation  She will continue to get yearly mammograms  Her colonoscopy is up-to-date  She is content with her weight  She should return to my office on a yearly basis after you remove the IUD

## 2023-03-31 LAB
HPV HR 12 DNA CVX QL NAA+PROBE: NEGATIVE
HPV16 DNA CVX QL NAA+PROBE: NEGATIVE
HPV18 DNA CVX QL NAA+PROBE: NEGATIVE

## 2023-04-05 DIAGNOSIS — B37.31 VAGINAL YEAST INFECTION: Primary | ICD-10-CM

## 2023-04-05 LAB
LAB AP GYN PRIMARY INTERPRETATION: NORMAL
Lab: NORMAL
PATH INTERP SPEC-IMP: NORMAL

## 2023-04-05 RX ORDER — FLUCONAZOLE 200 MG/1
TABLET ORAL
Qty: 2 TABLET | Refills: 3 | Status: SHIPPED | OUTPATIENT
Start: 2023-04-05 | End: 2023-04-06

## 2023-04-27 ENCOUNTER — ANESTHESIA EVENT (OUTPATIENT)
Dept: GASTROENTEROLOGY | Facility: AMBULARY SURGERY CENTER | Age: 53
End: 2023-04-27

## 2023-04-27 ENCOUNTER — HOSPITAL ENCOUNTER (OUTPATIENT)
Dept: GASTROENTEROLOGY | Facility: AMBULARY SURGERY CENTER | Age: 53
Setting detail: OUTPATIENT SURGERY
Discharge: HOME | End: 2023-04-27
Attending: INTERNAL MEDICINE

## 2023-04-27 ENCOUNTER — ANESTHESIA (OUTPATIENT)
Dept: GASTROENTEROLOGY | Facility: AMBULARY SURGERY CENTER | Age: 53
End: 2023-04-27

## 2023-04-27 VITALS
HEIGHT: 69 IN | BODY MASS INDEX: 24.44 KG/M2 | RESPIRATION RATE: 18 BRPM | WEIGHT: 165 LBS | OXYGEN SATURATION: 100 % | HEART RATE: 67 BPM | DIASTOLIC BLOOD PRESSURE: 61 MMHG | TEMPERATURE: 96.9 F | SYSTOLIC BLOOD PRESSURE: 116 MMHG

## 2023-04-27 DIAGNOSIS — Z12.11 SCREENING FOR COLON CANCER: ICD-10-CM

## 2023-04-27 RX ORDER — PROPOFOL 10 MG/ML
INJECTION, EMULSION INTRAVENOUS AS NEEDED
Status: DISCONTINUED | OUTPATIENT
Start: 2023-04-27 | End: 2023-04-27

## 2023-04-27 RX ORDER — SODIUM CHLORIDE, SODIUM LACTATE, POTASSIUM CHLORIDE, CALCIUM CHLORIDE 600; 310; 30; 20 MG/100ML; MG/100ML; MG/100ML; MG/100ML
INJECTION, SOLUTION INTRAVENOUS CONTINUOUS PRN
Status: DISCONTINUED | OUTPATIENT
Start: 2023-04-27 | End: 2023-04-27

## 2023-04-27 RX ORDER — LIDOCAINE HYDROCHLORIDE 10 MG/ML
INJECTION, SOLUTION EPIDURAL; INFILTRATION; INTRACAUDAL; PERINEURAL AS NEEDED
Status: DISCONTINUED | OUTPATIENT
Start: 2023-04-27 | End: 2023-04-27

## 2023-04-27 RX ADMIN — SODIUM CHLORIDE, SODIUM LACTATE, POTASSIUM CHLORIDE, AND CALCIUM CHLORIDE: .6; .31; .03; .02 INJECTION, SOLUTION INTRAVENOUS at 09:20

## 2023-04-27 RX ADMIN — PROPOFOL 50 MG: 10 INJECTION, EMULSION INTRAVENOUS at 09:42

## 2023-04-27 RX ADMIN — PROPOFOL 100 MG: 10 INJECTION, EMULSION INTRAVENOUS at 09:23

## 2023-04-27 RX ADMIN — PROPOFOL 50 MG: 10 INJECTION, EMULSION INTRAVENOUS at 09:30

## 2023-04-27 RX ADMIN — PROPOFOL 50 MG: 10 INJECTION, EMULSION INTRAVENOUS at 09:35

## 2023-04-27 RX ADMIN — PROPOFOL 50 MG: 10 INJECTION, EMULSION INTRAVENOUS at 09:27

## 2023-04-27 RX ADMIN — LIDOCAINE HYDROCHLORIDE 50 MG: 10 INJECTION, SOLUTION EPIDURAL; INFILTRATION; INTRACAUDAL at 09:23

## 2023-04-27 RX ADMIN — PROPOFOL 50 MG: 10 INJECTION, EMULSION INTRAVENOUS at 09:25

## 2023-04-27 RX ADMIN — PROPOFOL 50 MG: 10 INJECTION, EMULSION INTRAVENOUS at 09:32

## 2023-04-27 RX ADMIN — Medication 40 MG: at 09:35

## 2023-04-27 RX ADMIN — PROPOFOL 50 MG: 10 INJECTION, EMULSION INTRAVENOUS at 09:38

## 2023-04-27 NOTE — H&P
History and Physical -  [facility]  Awilda Malone 45 y o  female MRN: [id number]    HPI: Awilda Malone is a 45y o  year old female who presents for screening colonoscopy      Review of Systems    Historical Information   Past Medical History:   Diagnosis Date   • Anemia     s/p gastric bypass-gets iron infusions prn   • B12 deficiency    • Basal cell carcinoma     forehead   • Cancer ([address] )     basal cell cancer forehead   • Colon polyp    • COVID-19     result obtained 22-done at [facility]-positive   • Gastric bypass status for obesity    • GERD (gastroesophageal reflux disease)    • History of transfusion    • Shoulder bursitis    • Sore throat     loss of taste- had covid screening done 4/15/22 at [facility]   • Wears glasses      Past Surgical History:   Procedure Laterality Date   •  SECTION  1991    x1   • CHOLECYSTECTOMY     • GASTRIC BYPASS      for morbid obesity   Jonathon-en-Y    • HEMORRHOID SURGERY     • TONSILLECTOMY     • WISDOM TOOTH EXTRACTION       Social History   Social History     Substance and Sexual Activity   Alcohol Use Yes    Comment: rarely     Social History     Substance and Sexual Activity   Drug Use No     Social History     Tobacco Use   Smoking Status Never   Smokeless Tobacco Never     Family History   Problem Relation Age of Onset   • Lung cancer Mother         was a smoker   • Cancer Mother         lung-hx of smoking   • Pancreatic cancer Father    • Cancer Father         pancreatic   • No Known Problems Brother    • Skin cancer Maternal Grandfather    • No Known Problems Maternal Aunt    • No Known Problems Maternal Aunt    • No Known Problems Maternal Aunt    • No Known Problems Maternal Aunt    • No Known Problems Paternal Aunt    • No Known Problems Paternal Aunt    • No Known Problems Paternal Aunt    • No Known Problems Paternal Aunt    • No Known Problems Paternal Aunt    • No Known Problems Maternal Grandmother    • No Known Problems Paternal "Grandmother    • No Known Problems Paternal Grandfather    • No Known Problems Son    • No Known Problems Son        Meds/Allergies     (Not in a hospital admission)      No Known Allergies    Objective     /73   Pulse 68   Temp (!) 96 9 °F (36 1 °C) (Temporal)   Resp 22   Ht 5' 9\" (1 753 m)   Wt 74 8 kg (165 lb)   LMP 02/02/2022 (Approximate)   SpO2 99%   BMI 24 37 kg/m²       PHYSICAL EXAM    Gen: NAD  CV: RRR  CHEST: Clear  ABD: soft, NT/ND  EXT: no edema  Neuro: AAO      ASSESSMENT/PLAN:  This is a 45y o  year old female here for screening colonoscopy    PLAN:   Procedure: Colonoscopy with biopsy and possible polypectomy    "

## 2023-04-27 NOTE — ANESTHESIA POSTPROCEDURE EVALUATION
Post-Op Assessment Note    CV Status:  Stable  Pain Score: 0    Pain management: adequate     Mental Status:  Alert and awake   Hydration Status:  Euvolemic   PONV Controlled:  Controlled   Airway Patency:  Patent      Post Op Vitals Reviewed: Yes      Staff: Anesthesiologist, CRNA         There were no known notable events for this encounter      BP 94/53 (04/27/23 0949)    Temp (!) 96 9 °F (36 1 °C) (04/27/23 0949)    Pulse 61 (sr) (04/27/23 0949)   Resp 16 (04/27/23 0949)    SpO2 99 % (04/27/23 0949)

## 2023-04-27 NOTE — ANESTHESIA PREPROCEDURE EVALUATION
Procedure:  COLONOSCOPY    Relevant Problems   GI/HEPATIC   (+) Chronic GERD      HEMATOLOGY   (+) Anemia      PULMONARY   (+) SOB (shortness of breath)        Physical Exam    Airway    Mallampati score: III  TM Distance: >3 FB  Neck ROM: full     Dental       Cardiovascular  Cardiovascular exam normal    Pulmonary  Pulmonary exam normal     Other Findings       Anemia   Biceps tendinitis of left upper extremity   Iron deficiency   Left shoulder pain   Postgastrectomy malabsorption   Rotator cuff syndrome of left shoulder   Shoulder pain, left   Vitamin B12 deficiency   Chronic GERD   Intestinal malabsorption   Heart palpitations   SOB (shortness of breath)   Encounter for screening colonoscopy         Anesthesia Plan  ASA Score- 2     Anesthesia Type- IV sedation with anesthesia with ASA Monitors  Additional Monitors:   Airway Plan:           Plan Factors-Exercise tolerance (METS): >4 METS  Chart reviewed  Existing labs reviewed  Patient summary reviewed  Patient is not a current smoker  Induction- intravenous  Postoperative Plan-     Informed Consent- Anesthetic plan and risks discussed with patient  I personally reviewed this patient with the CRNA  Discussed and agreed on the Anesthesia Plan with the CRNA  Mendel Oman

## 2023-05-03 ENCOUNTER — TRANSCRIBE ORDERS (OUTPATIENT)
Dept: GASTROENTEROLOGY | Facility: CLINIC | Age: 53
End: 2023-05-03

## 2023-05-17 ENCOUNTER — HOSPITAL ENCOUNTER (OUTPATIENT)
Dept: MAMMOGRAPHY | Facility: CLINIC | Age: 53
Discharge: HOME | End: 2023-05-17

## 2023-05-17 ENCOUNTER — HOSPITAL ENCOUNTER (OUTPATIENT)
Dept: ULTRASOUND IMAGING | Facility: CLINIC | Age: 53
Discharge: HOME | End: 2023-05-17

## 2023-05-17 VITALS — WEIGHT: 165 LBS | BODY MASS INDEX: 24.44 KG/M2 | HEIGHT: 69 IN

## 2023-05-17 DIAGNOSIS — R92.8 MAMMOGRAM ABNORMAL: ICD-10-CM

## 2023-05-17 DIAGNOSIS — R92.8 ABNORMAL MAMMOGRAM: ICD-10-CM

## 2024-01-02 ENCOUNTER — HOSPITAL ENCOUNTER (OUTPATIENT)
Dept: MAMMOGRAPHY | Facility: CLINIC | Age: 54
Discharge: HOME | End: 2024-01-02
Payer: COMMERCIAL

## 2024-01-02 DIAGNOSIS — R92.8 ABNORMAL MAMMOGRAM: ICD-10-CM

## 2024-01-02 PROCEDURE — 77065 DX MAMMO INCL CAD UNI: CPT

## 2024-01-02 PROCEDURE — G0279 TOMOSYNTHESIS, MAMMO: HCPCS

## 2024-05-20 ENCOUNTER — HOSPITAL ENCOUNTER (OUTPATIENT)
Dept: RADIOLOGY | Facility: MEDICAL CENTER | Age: 54
Discharge: HOME | End: 2024-05-20
Payer: COMMERCIAL

## 2024-05-20 VITALS — HEIGHT: 69 IN | WEIGHT: 165 LBS | BODY MASS INDEX: 24.44 KG/M2

## 2024-05-20 DIAGNOSIS — Z12.31 OTHER SCREENING MAMMOGRAM: ICD-10-CM

## 2024-05-20 PROCEDURE — 77067 SCR MAMMO BI INCL CAD: CPT

## 2024-05-20 PROCEDURE — 77063 BREAST TOMOSYNTHESIS BI: CPT

## 2024-06-26 ENCOUNTER — TELEPHONE (OUTPATIENT)
Age: 54
End: 2024-06-26

## 2024-06-26 NOTE — TELEPHONE ENCOUNTER
Patient called to schedule her annual appt with Dr. Murphy. She is scheduled for 7/2. She is requesting to get her hormonal levels checked. Is this something that should be done before the visit so that they can go over the results?

## 2024-07-02 ENCOUNTER — ANNUAL EXAM (OUTPATIENT)
Dept: OBGYN CLINIC | Facility: CLINIC | Age: 54
End: 2024-07-02
Payer: COMMERCIAL

## 2024-07-02 VITALS — WEIGHT: 170 LBS | BODY MASS INDEX: 25.1 KG/M2 | DIASTOLIC BLOOD PRESSURE: 80 MMHG | SYSTOLIC BLOOD PRESSURE: 124 MMHG

## 2024-07-02 DIAGNOSIS — Z01.419 WOMEN'S ANNUAL ROUTINE GYNECOLOGICAL EXAMINATION: ICD-10-CM

## 2024-07-02 DIAGNOSIS — Z78.0 MENOPAUSE: Primary | ICD-10-CM

## 2024-07-02 PROCEDURE — 99396 PREV VISIT EST AGE 40-64: CPT | Performed by: OBSTETRICS & GYNECOLOGY

## 2024-07-02 NOTE — PROGRESS NOTES
Ambulatory Visit  Name: Jessica Amaya      : 1970      MRN: [id number]  Encounter Provider: Joshua Murphy MD  Encounter Date: 2024   Encounter department: [facility]    Assessment & Plan   1. Menopause  -     FSH and LH; Future  2. Women's annual routine gynecological examination  3.  She was informed of a stable perimenopausal GYN examination.  She we will check a serum FSH and LH if elevated will make arrangements for removal of the IUD.  She is content with her weight.  She feels safe at home.  Colonoscopy is up-to-date.  There is no major  or GI complaint.  There are no new major family illnesses to report.  She should return to my office in 1 year unless new issues occur she will make arrangements for IUD removal.    History of Present Illness     Jessica Amaya is a 54 y.o. female who presents for her annual GYN examination.  She is a  4 para 2 with 1  section followed by 1 successful .  Her current method of contraception includes the Mirena IUD.  This was placed in year .  She is now having hot flashes and night sweats consistent with menopause.  Serum FSH and LH.  If these are consistent we will make arrangements for removal of the IUD.  She feels safe at home.  She is in a stable relationship.  Denies any major  or GI complaint.  She is content with her weight would like to lose more.  Colonoscopy is up-to-date.  Denies any prior depression or anxiety.  There are no major family illnesses to report.  She does not need a Pap smear today.  She will continue getting yearly mammograms.  Medication list reviewed.  She is only taking vitamins.    Review of Systems   All other systems reviewed and are negative.      Objective     /80   Wt 77.1 kg (170 lb)   LMP 2022 (Approximate)   BMI 25.10 kg/m²     Physical Exam  Vitals reviewed. Exam conducted with a chaperone present.   Constitutional:       Appearance: Normal appearance. She is normal weight.    HENT:      Head: Normocephalic and atraumatic.      Nose: Nose normal.      Mouth/Throat:      Pharynx: Oropharynx is clear.   Eyes:      Extraocular Movements: Extraocular movements intact.      Pupils: Pupils are equal, round, and reactive to light.   Cardiovascular:      Rate and Rhythm: Normal rate and regular rhythm.   Pulmonary:      Effort: Pulmonary effort is normal.      Breath sounds: Normal breath sounds.   Chest:   Breasts:     Breasts are symmetrical.      Right: Normal.      Left: Normal.   Abdominal:      General: Abdomen is flat. A surgical scar is present. Bowel sounds are normal.      Palpations: Abdomen is soft.      Tenderness: There is no abdominal tenderness.      Hernia: No hernia is present. There is no hernia in the umbilical area, ventral area, left inguinal area or right inguinal area.          Comments: There are 2 midline incisions the operative from a bariatric surgery approximately 15 years ago.  Pullara 1 is a primary  section over 25 years ago for stat .  Both scars healing well.   Genitourinary:     General: Normal vulva.      Pubic Area: No rash or pubic lice.       Labia:         Right: No rash, tenderness, lesion or injury.         Left: No rash, tenderness, lesion or injury.       Urethra: No prolapse, urethral pain, urethral swelling or urethral lesion.      Vagina: Normal. No signs of injury and foreign body. No vaginal discharge, erythema, tenderness, bleeding, lesions or prolapsed vaginal walls.      Cervix: Normal.      Uterus: Normal.       Adnexa: Right adnexa normal and left adnexa normal.        Right: No mass, tenderness or fullness.          Left: No mass, tenderness or fullness.        Rectum: Normal.      Comments: The external genitalia normal limits the vagina is clean the cervix is parous and anterior.  The IUD string was seen.  The adnexa clear bilaterally, there is no evidence of prolapse.  A Pap smear was performed because the low risk.   Recent bladder normal working relationship.  Musculoskeletal:         General: Normal range of motion.      Cervical back: Normal range of motion and neck supple.   Skin:     General: Skin is warm and dry.   Neurological:      General: No focal deficit present.      Mental Status: She is alert and oriented to person, place, and time.   Psychiatric:         Mood and Affect: Mood normal.         Behavior: Behavior normal.         Thought Content: Thought content normal.       Administrative Statements

## 2024-07-02 NOTE — PATIENT INSTRUCTIONS
The patient was informed of a stable perimenopausal GYN examination.  Will check a serum FSH and LH if an abnormal elevated we will make arranges for removal of the IUD.  She will continue getting yearly mammograms.  She continue to exercise.  She should return to my office in 1 year unless new issues occur.

## 2024-07-29 ENCOUNTER — APPOINTMENT (OUTPATIENT)
Dept: LAB | Age: 54
End: 2024-07-29
Payer: COMMERCIAL

## 2024-07-29 DIAGNOSIS — Z78.0 MENOPAUSE: ICD-10-CM

## 2024-07-29 LAB
FSH SERPL-ACNC: 62.2 MIU/ML
LH SERPL-ACNC: 20.4 MIU/ML

## 2024-07-29 PROCEDURE — 83001 ASSAY OF GONADOTROPIN (FSH): CPT

## 2024-07-29 PROCEDURE — 36415 COLL VENOUS BLD VENIPUNCTURE: CPT

## 2024-07-29 PROCEDURE — 83002 ASSAY OF GONADOTROPIN (LH): CPT

## 2024-07-31 ENCOUNTER — TELEPHONE (OUTPATIENT)
Age: 54
End: 2024-07-31

## 2024-07-31 NOTE — TELEPHONE ENCOUNTER
Pt wants to know if she should keep appt 08/07, since it is contingent on your blood work results, please follow up with pt, per pt request

## 2024-08-07 ENCOUNTER — PROCEDURE VISIT (OUTPATIENT)
Dept: OBGYN CLINIC | Facility: CLINIC | Age: 54
End: 2024-08-07
Payer: COMMERCIAL

## 2024-08-07 VITALS — SYSTOLIC BLOOD PRESSURE: 116 MMHG | WEIGHT: 177 LBS | DIASTOLIC BLOOD PRESSURE: 78 MMHG | BODY MASS INDEX: 26.14 KG/M2

## 2024-08-07 DIAGNOSIS — Z30.432 ENCOUNTER FOR IUD REMOVAL: Primary | ICD-10-CM

## 2024-08-07 PROCEDURE — 58301 REMOVE INTRAUTERINE DEVICE: CPT | Performed by: OBSTETRICS & GYNECOLOGY

## 2024-08-07 NOTE — PROGRESS NOTES
Iud removal    Performed by: Joshua Murphy MD  Authorized by: Joshua Murphy MD    Procedure: IUD removal    Reason for removal: patient request    Strings visualized: yes    Tenaculum applied to cervix: no    Cervix dilated: no    IUD grasped by forceps: yes    Performed with ultrasound guidance: no    IUD removed: yes    Date/Time of Removal:  8/7/2024 1:50 PM  Removed without complications: yes    IUD intact: yes     Is a 54-year-old white female.  She is now proven evidence of menopause of elevated FSH and LH.  We have now removed her IUD for that reason it is no longer needed.  The IUD was removed for difficulty and disposal.  Prior to disposal it was shown to the patient to verify that it was intact.  She will keep me informed of her progress.  Return the office on a as needed basis.

## 2024-10-16 ENCOUNTER — PATIENT OUTREACH (OUTPATIENT)
Dept: HEMATOLOGY ONCOLOGY | Facility: CLINIC | Age: 54
End: 2024-10-16

## 2024-10-16 NOTE — PROGRESS NOTES
Call to patient regarding self referral to hematology. Verified whether patient has received any treatment since last iron panel in January 2024. LVM requesting call back. Will send for scheduling based on last iron studies and place orders once scheduled.

## 2024-12-02 ENCOUNTER — TELEPHONE (OUTPATIENT)
Dept: HEMATOLOGY ONCOLOGY | Facility: CLINIC | Age: 54
End: 2024-12-02

## 2024-12-03 ENCOUNTER — TELEPHONE (OUTPATIENT)
Dept: HEMATOLOGY ONCOLOGY | Facility: CLINIC | Age: 54
End: 2024-12-03

## 2024-12-03 ENCOUNTER — OFFICE VISIT (OUTPATIENT)
Dept: HEMATOLOGY ONCOLOGY | Facility: CLINIC | Age: 54
End: 2024-12-03
Payer: COMMERCIAL

## 2024-12-03 VITALS
HEIGHT: 69 IN | SYSTOLIC BLOOD PRESSURE: 124 MMHG | TEMPERATURE: 97.8 F | BODY MASS INDEX: 26.66 KG/M2 | DIASTOLIC BLOOD PRESSURE: 86 MMHG | OXYGEN SATURATION: 98 % | RESPIRATION RATE: 16 BRPM | HEART RATE: 84 BPM | WEIGHT: 180 LBS

## 2024-12-03 DIAGNOSIS — E53.8 VITAMIN B12 DEFICIENCY: ICD-10-CM

## 2024-12-03 DIAGNOSIS — E61.1 IRON DEFICIENCY: ICD-10-CM

## 2024-12-03 DIAGNOSIS — D50.9 IRON DEFICIENCY ANEMIA, UNSPECIFIED IRON DEFICIENCY ANEMIA TYPE: Primary | ICD-10-CM

## 2024-12-03 DIAGNOSIS — Z90.3 POSTGASTRECTOMY MALABSORPTION: ICD-10-CM

## 2024-12-03 DIAGNOSIS — K91.2 POSTGASTRECTOMY MALABSORPTION: ICD-10-CM

## 2024-12-03 PROCEDURE — 99203 OFFICE O/P NEW LOW 30 MIN: CPT | Performed by: INTERNAL MEDICINE

## 2024-12-03 RX ORDER — SODIUM CHLORIDE 9 MG/ML
20 INJECTION, SOLUTION INTRAVENOUS ONCE
Status: CANCELLED | OUTPATIENT
Start: 2024-12-03

## 2024-12-03 RX ORDER — CYANOCOBALAMIN 1000 UG/ML
1000 INJECTION, SOLUTION INTRAMUSCULAR; SUBCUTANEOUS ONCE
OUTPATIENT
Start: 2024-12-03

## 2024-12-03 NOTE — PROGRESS NOTES
Name: Jessica Amaya      : 1970      MRN: [id number]  Encounter Provider: Noni Aquino DO  Encounter Date: 12/3/2024   Encounter department: [facility] [place]     :  Assessment & Plan  Iron deficiency anemia, unspecified iron deficiency anemia type  Chronic fatigue  10/12/2024 labs reviewed- Hemoglobin 10.4, MCV 68, RDW 18.3. B12 74. Ferritin 3, iron 23, TIBC 539.  Last Folate check in - 11.2  LMP- 1.5 year ago  COY in - WNL  EGD in 2019- marginal ulcer but otherwise post-op changes.  Denied any melena, dark. Black stool  Plan to schedule Ferriheme x2 and B12 injection weekly x4 then monthly- recheck in 3 month. Will also check folate for completeness.    Orders:    CBC and differential; Future    Iron Panel (Includes Ferritin, Iron Sat%, Iron, and TIBC); Future    Vitamin B12; Future    Folate; Future    Iron Panel (Includes Ferritin, Iron Sat%, Iron, and TIBC); Future    Postgastrectomy malabsorption  Noted history  H/o Jonathon-en-Y gastric bypass in .         Iron deficiency  Seen in above       Vitamin B12 deficiency  Seen in above           History of Present Illness     Reason for Visit / CC: Anemia  New patient consult  Jessica Amaya is a 54 y.o. female who has a past medical history of gastric bypass/ H/o Jonathon-en-Y gastric bypass,  Anemia, Iron deficiency, B12 deficiency, Colon polyp, GERD,  History of transfusion (2018) presents in office visit for initial consultation for anemia.  Patient reported she had malabsorption after gastric bypass surgery in  for obesity and she was then put on B12 injection monthly per PCP. She reported h/o anemia requiring transfusion in 2018 and she was found to have iron def and referred to see a hematologist at [facility] for iron infusion. Her last iron infusion was about 2-3 years. Patient reported consistently feeling fatigue. Denied any CP, SOB or palpitation. No melena or dark stool. LMP 1.5 years ago. Patient had recent  lab work done revealing iron deficiency and B12 deficiency again and was referred by PCP for hem onc eval.    Otherwise, patient denies any N/V/D, abdominal pain, numbness/ tingling sensations.    Patient is up-to-date to her mammogram, Pap smear-following with OB/GYN. Patient has colonoscopy performed in - overall normal, repeat in 10 year. EGD in 2019- following with GI.        Pertinent Medical History        Review of Systems A complete review of systems is negative other than that noted above in the HPI.  Pertinent Medical History     Medical History Reviewed by provider this encounter:  Tobacco  Allergies  Meds  Problems  Med Hx  Surg Hx  Fam Hx     .  Past Medical History   Past Medical History:   Diagnosis Date    Anemia     s/p gastric bypass-gets iron infusions prn    B12 deficiency     Basal cell carcinoma     forehead    Cancer (HCC)     basal cell cancer forehead    Colon polyp     COVID-19     result obtained 22-done at [facility]-positive    Gastric bypass status for obesity     GERD (gastroesophageal reflux disease)     History of transfusion     Shoulder bursitis     Sore throat     loss of taste- had covid screening done 4/15/22 at [facility]    Wears glasses      Past Surgical History:   Procedure Laterality Date     SECTION  1991    x1    CHOLECYSTECTOMY      GASTRIC BYPASS      for morbid obesity   Jonathon-en-Y     HEMORRHOID SURGERY      TONSILLECTOMY      WISDOM TOOTH EXTRACTION       Family History   Problem Relation Age of Onset    Lung cancer Mother         was a smoker    Cancer Mother         lung-hx of smoking    Pancreatic cancer Father     Cancer Father         pancreatic    No Known Problems Brother     Skin cancer Maternal Grandfather     No Known Problems Maternal Aunt     No Known Problems Maternal Aunt     No Known Problems Maternal Aunt     No Known Problems Maternal Aunt     No Known Problems Paternal Aunt     No Known Problems Paternal Aunt     No Known  Problems Paternal Aunt     No Known Problems Paternal Aunt     No Known Problems Paternal Aunt     No Known Problems Maternal Grandmother     No Known Problems Paternal Grandmother     No Known Problems Paternal Grandfather     No Known Problems Son     No Known Problems Son       reports that she has never smoked. She has never used smokeless tobacco. She reports current alcohol use. She reports that she does not use drugs.  Current Outpatient Medications on File Prior to Visit   Medication Sig Dispense Refill    multivitamin (THERAGRAN) TABS Take 1 tablet by mouth daily      Sod Picosulfate-Mag Ox-Cit Acd (Clenpiq) 10-3.5-12 MG-GM -GM/160ML SOLN Follow instructions as given during the office. (Patient not taking: Reported on 3/28/2023) 320 mL 0    topiramate (TOPAMAX) 50 MG tablet Take 1.5 tablets (75 mg total) by mouth daily (Patient not taking: Reported on 3/28/2023) 45 tablet 3    VITAMIN D PO Take by mouth in the morning (Patient not taking: Reported on 3/28/2023)       Current Facility-Administered Medications on File Prior to Visit   Medication Dose Route Frequency Provider Last Rate Last Admin    cyanocobalamin injection 1,000 mcg  1,000 mcg Intramuscular Weekly Michelle Arceo PA-C   1,000 mcg at 01/05/21 1114   No Known Allergies   Current Outpatient Medications on File Prior to Visit   Medication Sig Dispense Refill    multivitamin (THERAGRAN) TABS Take 1 tablet by mouth daily      Sod Picosulfate-Mag Ox-Cit Acd (Clenpiq) 10-3.5-12 MG-GM -GM/160ML SOLN Follow instructions as given during the office. (Patient not taking: Reported on 3/28/2023) 320 mL 0    topiramate (TOPAMAX) 50 MG tablet Take 1.5 tablets (75 mg total) by mouth daily (Patient not taking: Reported on 3/28/2023) 45 tablet 3    VITAMIN D PO Take by mouth in the morning (Patient not taking: Reported on 3/28/2023)       Current Facility-Administered Medications on File Prior to Visit   Medication Dose Route Frequency Provider Last Rate Last  "Admin    cyanocobalamin injection 1,000 mcg  1,000 mcg Intramuscular Weekly Michelle Arceo PA-C   1,000 mcg at 01/05/21 1114      Social History     Tobacco Use    Smoking status: Never    Smokeless tobacco: Never   Vaping Use    Vaping status: Never Used   Substance and Sexual Activity    Alcohol use: Yes     Comment: rarely    Drug use: No    Sexual activity: Yes     Partners: Male     Birth control/protection: I.U.D.         Objective   /86 (BP Location: Left arm, Patient Position: Sitting, Cuff Size: Adult)   Pulse 84   Temp 97.8 °F (36.6 °C) (Temporal)   Resp 16   Ht 5' 9\" (1.753 m)   Wt 81.6 kg (180 lb)   LMP 02/02/2022 (Approximate)   SpO2 98%   BMI 26.58 kg/m²     Blood pressure 124/86, pulse 84, temperature 97.8 °F (36.6 °C), temperature source Temporal, resp. rate 16, height 5' 9\" (1.753 m), weight 81.6 kg (180 lb), last menstrual period 02/02/2022, SpO2 98%, not currently breastfeeding.  ECOG    Physical Exam  Vitals and nursing note reviewed.   Constitutional:       General: She is not in acute distress.     Appearance: She is not ill-appearing.   HENT:      Head: Normocephalic and atraumatic.      Nose: No congestion or rhinorrhea.      Mouth/Throat:      Mouth: Mucous membranes are moist.   Eyes:      Extraocular Movements: Extraocular movements intact.      Conjunctiva/sclera: Conjunctivae normal.   Cardiovascular:      Rate and Rhythm: Normal rate and regular rhythm.      Pulses: Normal pulses.      Heart sounds: Normal heart sounds.      No friction rub.   Pulmonary:      Effort: No respiratory distress.      Breath sounds: Normal breath sounds. No wheezing or rales.   Chest:      Chest wall: No tenderness.   Abdominal:      General: There is no distension.      Palpations: Abdomen is soft.      Tenderness: There is no abdominal tenderness. There is no guarding or rebound.   Musculoskeletal:         General: No swelling or tenderness.      Cervical back: Neck supple. No rigidity or " "tenderness.   Skin:     General: Skin is warm.      Capillary Refill: Capillary refill takes less than 2 seconds.   Neurological:      Mental Status: She is alert and oriented to person, place, and time.   Psychiatric:         Mood and Affect: Mood normal.         Judgment: Judgment normal.          Labs: CBC/BMP: No results found for: \"WBC\", \"HGB\", \"HCT\", \"PLT\", \"BANDSPCT\", \"SODIUM\", \"K\", \"CL\", \"CO2\", \"BUN\", \"CREATININE\", \"GLUC\", \"CAIONIZED\", \"MG\", \"PHOS\"   TSH:       Radiology Results Review: I have reviewed radiology reports from [facility] including: procedure reports and Ultrasound(s).      "

## 2024-12-03 NOTE — ASSESSMENT & PLAN NOTE
Chronic fatigue  10/12/2024 labs reviewed- Hemoglobin 10.4, MCV 68, RDW 18.3. B12 74. Ferritin 3, iron 23, TIBC 539.  Last Folate check in 2020- 11.2  LMP- 1.5 year ago  COY in 2023- WNL  EGD in 2019- marginal ulcer but otherwise post-op changes.  Denied any melena, dark. Black stool  Plan to schedule Ferriheme x2 and B12 injection weekly x4 then monthly- recheck in 3 month. Will also check folate for completeness.    Orders:    CBC and differential; Future    Iron Panel (Includes Ferritin, Iron Sat%, Iron, and TIBC); Future    Vitamin B12; Future    Folate; Future    Iron Panel (Includes Ferritin, Iron Sat%, Iron, and TIBC); Future

## 2024-12-04 DIAGNOSIS — Z90.3 POSTGASTRECTOMY MALABSORPTION: ICD-10-CM

## 2024-12-04 DIAGNOSIS — E53.8 VITAMIN B12 DEFICIENCY: Primary | ICD-10-CM

## 2024-12-04 DIAGNOSIS — K91.2 POSTGASTRECTOMY MALABSORPTION: ICD-10-CM

## 2024-12-04 RX ORDER — CYANOCOBALAMIN 1000 UG/ML
1000 INJECTION, SOLUTION INTRAMUSCULAR; SUBCUTANEOUS ONCE
OUTPATIENT
Start: 2025-02-06

## 2024-12-05 ENCOUNTER — APPOINTMENT (OUTPATIENT)
Dept: LAB | Age: 54
End: 2024-12-05
Payer: COMMERCIAL

## 2024-12-05 ENCOUNTER — TELEPHONE (OUTPATIENT)
Dept: HEMATOLOGY ONCOLOGY | Facility: CLINIC | Age: 54
End: 2024-12-05

## 2024-12-05 DIAGNOSIS — D50.9 IRON DEFICIENCY ANEMIA, UNSPECIFIED IRON DEFICIENCY ANEMIA TYPE: ICD-10-CM

## 2024-12-05 DIAGNOSIS — E61.1 IRON DEFICIENCY: Primary | ICD-10-CM

## 2024-12-05 LAB — FERRITIN SERPL-MCNC: 3 NG/ML (ref 11–307)

## 2024-12-05 PROCEDURE — 36415 COLL VENOUS BLD VENIPUNCTURE: CPT

## 2024-12-05 PROCEDURE — 83550 IRON BINDING TEST: CPT

## 2024-12-05 PROCEDURE — 83540 ASSAY OF IRON: CPT

## 2024-12-05 PROCEDURE — 82728 ASSAY OF FERRITIN: CPT

## 2024-12-05 RX ORDER — SODIUM CHLORIDE 9 MG/ML
20 INJECTION, SOLUTION INTRAVENOUS ONCE
OUTPATIENT
Start: 2024-12-17

## 2024-12-05 NOTE — TELEPHONE ENCOUNTER
"Per JAMAICA Leal, \"These let patient know that Feraheme was not covered by her insurance therefore we will have to do Venofer 300 mg weekly x 4 doses.    Call out to patient and left vm. Orders changed. Please review schedule  "

## 2024-12-06 LAB
IRON SATN MFR SERPL: 2 % (ref 15–50)
IRON SERPL-MCNC: 15 UG/DL (ref 50–212)
TIBC SERPL-MCNC: 735 UG/DL (ref 250–450)
UIBC SERPL-MCNC: 720 UG/DL (ref 155–355)

## 2024-12-09 ENCOUNTER — TELEPHONE (OUTPATIENT)
Age: 54
End: 2024-12-09

## 2024-12-19 ENCOUNTER — TELEPHONE (OUTPATIENT)
Dept: INFUSION CENTER | Facility: CLINIC | Age: 54
End: 2024-12-19

## 2024-12-19 NOTE — TELEPHONE ENCOUNTER
Left detailed voicemail for the patient re: new patient information. Spoke about what to expect, visitor policy, and no show policy. Left our phone number if she should have any questions or concerns.

## 2024-12-20 ENCOUNTER — HOSPITAL ENCOUNTER (OUTPATIENT)
Dept: INFUSION CENTER | Facility: CLINIC | Age: 54
End: 2024-12-20
Payer: COMMERCIAL

## 2024-12-20 VITALS
RESPIRATION RATE: 18 BRPM | TEMPERATURE: 97.3 F | OXYGEN SATURATION: 99 % | DIASTOLIC BLOOD PRESSURE: 74 MMHG | SYSTOLIC BLOOD PRESSURE: 111 MMHG | HEART RATE: 66 BPM

## 2024-12-20 DIAGNOSIS — E53.8 VITAMIN B12 DEFICIENCY: Primary | ICD-10-CM

## 2024-12-20 DIAGNOSIS — E61.1 IRON DEFICIENCY: ICD-10-CM

## 2024-12-20 PROCEDURE — 96365 THER/PROPH/DIAG IV INF INIT: CPT

## 2024-12-20 PROCEDURE — 96372 THER/PROPH/DIAG INJ SC/IM: CPT

## 2024-12-20 RX ORDER — CYANOCOBALAMIN 1000 UG/ML
1000 INJECTION, SOLUTION INTRAMUSCULAR; SUBCUTANEOUS ONCE
Status: COMPLETED | OUTPATIENT
Start: 2024-12-20 | End: 2024-12-20

## 2024-12-20 RX ORDER — SODIUM CHLORIDE 9 MG/ML
20 INJECTION, SOLUTION INTRAVENOUS ONCE
Status: COMPLETED | OUTPATIENT
Start: 2024-12-20 | End: 2024-12-20

## 2024-12-20 RX ORDER — CYANOCOBALAMIN 1000 UG/ML
1000 INJECTION, SOLUTION INTRAMUSCULAR; SUBCUTANEOUS ONCE
Status: CANCELLED | OUTPATIENT
Start: 2024-12-27

## 2024-12-20 RX ORDER — SODIUM CHLORIDE 9 MG/ML
20 INJECTION, SOLUTION INTRAVENOUS ONCE
Status: CANCELLED | OUTPATIENT
Start: 2024-12-27

## 2024-12-20 RX ADMIN — CYANOCOBALAMIN 1000 MCG: 1000 INJECTION INTRAMUSCULAR; SUBCUTANEOUS at 07:56

## 2024-12-20 RX ADMIN — SODIUM CHLORIDE 20 ML/HR: 0.9 INJECTION, SOLUTION INTRAVENOUS at 07:55

## 2024-12-20 RX ADMIN — IRON SUCROSE 300 MG: 20 INJECTION, SOLUTION INTRAVENOUS at 07:52

## 2024-12-20 NOTE — PROGRESS NOTES
Patient to infusion center for venofer infusion and vitamin b12 injection. Patient offers no complaints. VSS. Peripheral IV inserted without incident.

## 2024-12-20 NOTE — PROGRESS NOTES
Patient tolerated treatment without incident. Peripheral IV removed. Next appointment confirmed for 12/27/2024 at 0730. AVS offered and declined.

## 2024-12-27 ENCOUNTER — HOSPITAL ENCOUNTER (OUTPATIENT)
Dept: INFUSION CENTER | Facility: CLINIC | Age: 54
End: 2024-12-27
Payer: COMMERCIAL

## 2024-12-27 VITALS
RESPIRATION RATE: 18 BRPM | HEART RATE: 65 BPM | DIASTOLIC BLOOD PRESSURE: 75 MMHG | SYSTOLIC BLOOD PRESSURE: 116 MMHG | OXYGEN SATURATION: 98 % | TEMPERATURE: 97.6 F

## 2024-12-27 DIAGNOSIS — E53.8 VITAMIN B12 DEFICIENCY: ICD-10-CM

## 2024-12-27 DIAGNOSIS — E61.1 IRON DEFICIENCY: Primary | ICD-10-CM

## 2024-12-27 PROCEDURE — 96365 THER/PROPH/DIAG IV INF INIT: CPT

## 2024-12-27 PROCEDURE — 96372 THER/PROPH/DIAG INJ SC/IM: CPT

## 2024-12-27 RX ORDER — SODIUM CHLORIDE 9 MG/ML
20 INJECTION, SOLUTION INTRAVENOUS ONCE
Status: COMPLETED | OUTPATIENT
Start: 2024-12-27 | End: 2024-12-27

## 2024-12-27 RX ORDER — CYANOCOBALAMIN 1000 UG/ML
1000 INJECTION, SOLUTION INTRAMUSCULAR; SUBCUTANEOUS ONCE
OUTPATIENT
Start: 2025-01-03

## 2024-12-27 RX ORDER — SODIUM CHLORIDE 9 MG/ML
20 INJECTION, SOLUTION INTRAVENOUS ONCE
OUTPATIENT
Start: 2025-01-03

## 2024-12-27 RX ORDER — CYANOCOBALAMIN 1000 UG/ML
1000 INJECTION, SOLUTION INTRAMUSCULAR; SUBCUTANEOUS ONCE
Status: COMPLETED | OUTPATIENT
Start: 2024-12-27 | End: 2024-12-27

## 2024-12-27 RX ADMIN — SODIUM CHLORIDE 20 ML/HR: 0.9 INJECTION, SOLUTION INTRAVENOUS at 07:56

## 2024-12-27 RX ADMIN — CYANOCOBALAMIN 1000 MCG: 1000 INJECTION INTRAMUSCULAR; SUBCUTANEOUS at 07:56

## 2024-12-27 RX ADMIN — IRON SUCROSE 300 MG: 20 INJECTION, SOLUTION INTRAVENOUS at 07:56

## 2024-12-27 NOTE — PROGRESS NOTES
Patient to infusion for venofer.  She offers no complaints.  She tolerated treatment today. Next appointment confirmed 1/6 3268

## 2025-01-02 DIAGNOSIS — E61.1 IRON DEFICIENCY: Primary | ICD-10-CM

## 2025-01-02 DIAGNOSIS — E53.8 VITAMIN B12 DEFICIENCY: Primary | ICD-10-CM

## 2025-01-02 RX ORDER — CYANOCOBALAMIN 1000 UG/ML
1000 INJECTION, SOLUTION INTRAMUSCULAR; SUBCUTANEOUS ONCE
Status: CANCELLED | OUTPATIENT
Start: 2025-01-06

## 2025-01-02 RX ORDER — SODIUM CHLORIDE 9 MG/ML
20 INJECTION, SOLUTION INTRAVENOUS ONCE
Status: CANCELLED | OUTPATIENT
Start: 2025-01-06

## 2025-01-06 ENCOUNTER — HOSPITAL ENCOUNTER (OUTPATIENT)
Dept: INFUSION CENTER | Facility: CLINIC | Age: 55
Discharge: HOME | End: 2025-01-06
Payer: COMMERCIAL

## 2025-01-06 VITALS
RESPIRATION RATE: 17 BRPM | TEMPERATURE: 97.7 F | HEART RATE: 65 BPM | DIASTOLIC BLOOD PRESSURE: 70 MMHG | SYSTOLIC BLOOD PRESSURE: 101 MMHG | OXYGEN SATURATION: 98 %

## 2025-01-06 DIAGNOSIS — E61.1 IRON DEFICIENCY: ICD-10-CM

## 2025-01-06 DIAGNOSIS — E53.8 VITAMIN B12 DEFICIENCY: Primary | ICD-10-CM

## 2025-01-06 PROCEDURE — 96372 THER/PROPH/DIAG INJ SC/IM: CPT

## 2025-01-06 PROCEDURE — 96365 THER/PROPH/DIAG IV INF INIT: CPT

## 2025-01-06 RX ORDER — SODIUM CHLORIDE 9 MG/ML
20 INJECTION, SOLUTION INTRAVENOUS ONCE
Status: COMPLETED | OUTPATIENT
Start: 2025-01-06 | End: 2025-01-06

## 2025-01-06 RX ORDER — SODIUM CHLORIDE 9 MG/ML
20 INJECTION, SOLUTION INTRAVENOUS ONCE
Status: CANCELLED | OUTPATIENT
Start: 2025-01-17

## 2025-01-06 RX ORDER — CYANOCOBALAMIN 1000 UG/ML
1000 INJECTION, SOLUTION INTRAMUSCULAR; SUBCUTANEOUS ONCE
Status: CANCELLED | OUTPATIENT
Start: 2025-01-17

## 2025-01-06 RX ORDER — CYANOCOBALAMIN 1000 UG/ML
1000 INJECTION, SOLUTION INTRAMUSCULAR; SUBCUTANEOUS ONCE
Status: COMPLETED | OUTPATIENT
Start: 2025-01-06 | End: 2025-01-06

## 2025-01-06 RX ADMIN — CYANOCOBALAMIN 1000 MCG: 1000 INJECTION, SOLUTION INTRAMUSCULAR at 17:15

## 2025-01-06 RX ADMIN — SODIUM CHLORIDE 20 ML/HR: 0.9 INJECTION, SOLUTION INTRAVENOUS at 15:33

## 2025-01-06 RX ADMIN — IRON SUCROSE 300 MG: 20 INJECTION, SOLUTION INTRAVENOUS at 15:37

## 2025-01-06 NOTE — PROGRESS NOTES
Patient tolerated her treatment without any adverse reactions. B 12 given in left arm and she tolerated it well. Next appointment confirmed 1/17/25 at 0730 at [place]. Patient declined avs

## 2025-01-15 DIAGNOSIS — E61.1 IRON DEFICIENCY: Primary | ICD-10-CM

## 2025-01-15 RX ORDER — SODIUM CHLORIDE 9 MG/ML
20 INJECTION, SOLUTION INTRAVENOUS ONCE
Status: CANCELLED | OUTPATIENT
Start: 2025-01-17

## 2025-01-17 ENCOUNTER — HOSPITAL ENCOUNTER (OUTPATIENT)
Dept: INFUSION CENTER | Facility: CLINIC | Age: 55
End: 2025-01-17
Payer: COMMERCIAL

## 2025-01-17 VITALS
DIASTOLIC BLOOD PRESSURE: 74 MMHG | TEMPERATURE: 97.6 F | HEART RATE: 68 BPM | RESPIRATION RATE: 18 BRPM | SYSTOLIC BLOOD PRESSURE: 116 MMHG | OXYGEN SATURATION: 98 %

## 2025-01-17 DIAGNOSIS — E61.1 IRON DEFICIENCY: Primary | ICD-10-CM

## 2025-01-17 DIAGNOSIS — E53.8 VITAMIN B12 DEFICIENCY: ICD-10-CM

## 2025-01-17 PROCEDURE — 96365 THER/PROPH/DIAG IV INF INIT: CPT

## 2025-01-17 PROCEDURE — 96372 THER/PROPH/DIAG INJ SC/IM: CPT

## 2025-01-17 PROCEDURE — 96366 THER/PROPH/DIAG IV INF ADDON: CPT

## 2025-01-17 RX ORDER — CYANOCOBALAMIN 1000 UG/ML
1000 INJECTION, SOLUTION INTRAMUSCULAR; SUBCUTANEOUS ONCE
Status: CANCELLED | OUTPATIENT
Start: 2025-01-17

## 2025-01-17 RX ORDER — SODIUM CHLORIDE 9 MG/ML
20 INJECTION, SOLUTION INTRAVENOUS ONCE
Status: COMPLETED | OUTPATIENT
Start: 2025-01-17 | End: 2025-01-17

## 2025-01-17 RX ORDER — SODIUM CHLORIDE 9 MG/ML
20 INJECTION, SOLUTION INTRAVENOUS ONCE
Status: CANCELLED | OUTPATIENT
Start: 2025-01-17

## 2025-01-17 RX ORDER — CYANOCOBALAMIN 1000 UG/ML
1000 INJECTION, SOLUTION INTRAMUSCULAR; SUBCUTANEOUS ONCE
Status: COMPLETED | OUTPATIENT
Start: 2025-01-17 | End: 2025-01-17

## 2025-01-17 RX ADMIN — SODIUM CHLORIDE 20 ML/HR: 0.9 INJECTION, SOLUTION INTRAVENOUS at 07:54

## 2025-01-17 RX ADMIN — IRON SUCROSE 300 MG: 20 INJECTION, SOLUTION INTRAVENOUS at 07:54

## 2025-01-17 RX ADMIN — CYANOCOBALAMIN 1000 MCG: 1000 INJECTION, SOLUTION INTRAMUSCULAR at 07:53

## 2025-01-17 NOTE — PROGRESS NOTES
Patient completed Venofer treatment without issue. Removed PIV, confirmed follow up appt with LILA Sequeira in April. Pt aware of blood work needed after 3/3/25 for that appointment. Pt is aware that she needs to schedule Monthly B12 injections.

## 2025-01-17 NOTE — PROGRESS NOTES
Patient arrived today for B12 injection + venofer infusion, feeling well with no complaints. PIV inserted and B12 administered in R deltoid. Patient resting in reclining chair with call bell within reach

## 2025-02-11 DIAGNOSIS — K91.2 POSTGASTRECTOMY MALABSORPTION: ICD-10-CM

## 2025-02-11 DIAGNOSIS — Z90.3 POSTGASTRECTOMY MALABSORPTION: ICD-10-CM

## 2025-02-11 DIAGNOSIS — E53.8 VITAMIN B12 DEFICIENCY: Primary | ICD-10-CM

## 2025-02-11 RX ORDER — CYANOCOBALAMIN 1000 UG/ML
1000 INJECTION, SOLUTION INTRAMUSCULAR; SUBCUTANEOUS ONCE
Status: CANCELLED | OUTPATIENT
Start: 2025-02-13

## 2025-02-13 ENCOUNTER — HOSPITAL ENCOUNTER (OUTPATIENT)
Dept: INFUSION CENTER | Facility: CLINIC | Age: 55
Discharge: HOME | End: 2025-02-13
Payer: COMMERCIAL

## 2025-02-13 DIAGNOSIS — Z90.3 POSTGASTRECTOMY MALABSORPTION: Primary | ICD-10-CM

## 2025-02-13 DIAGNOSIS — E53.8 VITAMIN B12 DEFICIENCY: ICD-10-CM

## 2025-02-13 DIAGNOSIS — K91.2 POSTGASTRECTOMY MALABSORPTION: Primary | ICD-10-CM

## 2025-02-13 PROCEDURE — 96372 THER/PROPH/DIAG INJ SC/IM: CPT

## 2025-02-13 RX ORDER — CYANOCOBALAMIN 1000 UG/ML
1000 INJECTION, SOLUTION INTRAMUSCULAR; SUBCUTANEOUS ONCE
OUTPATIENT
Start: 2025-03-13

## 2025-02-13 RX ORDER — CYANOCOBALAMIN 1000 UG/ML
1000 INJECTION, SOLUTION INTRAMUSCULAR; SUBCUTANEOUS ONCE
Status: COMPLETED | OUTPATIENT
Start: 2025-02-13 | End: 2025-02-13

## 2025-02-13 RX ADMIN — CYANOCOBALAMIN 1000 MCG: 1000 INJECTION, SOLUTION INTRAMUSCULAR at 16:22

## 2025-02-13 NOTE — PROGRESS NOTES
Patient to infusion center for vitamin b12 injection. Patient offers no complaint. Vitamin b12 injection administered into right deltoid tolerated without incident. Next appointment confirmed for 3/13/2025 at 1530. AVS offered and declined.

## 2025-03-13 ENCOUNTER — HOSPITAL ENCOUNTER (OUTPATIENT)
Dept: INFUSION CENTER | Facility: CLINIC | Age: 55
Discharge: HOME | End: 2025-03-13
Payer: COMMERCIAL

## 2025-03-13 DIAGNOSIS — E53.8 VITAMIN B12 DEFICIENCY: Primary | ICD-10-CM

## 2025-03-13 DIAGNOSIS — Z90.3 POSTGASTRECTOMY MALABSORPTION: ICD-10-CM

## 2025-03-13 DIAGNOSIS — K91.2 POSTGASTRECTOMY MALABSORPTION: ICD-10-CM

## 2025-03-13 PROCEDURE — 96372 THER/PROPH/DIAG INJ SC/IM: CPT

## 2025-03-13 RX ORDER — CYANOCOBALAMIN 1000 UG/ML
1000 INJECTION, SOLUTION INTRAMUSCULAR; SUBCUTANEOUS ONCE
OUTPATIENT
Start: 2025-04-10

## 2025-03-13 RX ORDER — CYANOCOBALAMIN 1000 UG/ML
1000 INJECTION, SOLUTION INTRAMUSCULAR; SUBCUTANEOUS ONCE
Status: COMPLETED | OUTPATIENT
Start: 2025-03-13 | End: 2025-03-13

## 2025-03-13 RX ADMIN — CYANOCOBALAMIN 1000 MCG: 1000 INJECTION, SOLUTION INTRAMUSCULAR at 17:47

## 2025-03-13 NOTE — PROGRESS NOTES
Patient to infusion for B12.  She tolerated injection in Left arm.  Next appointment confirmed 4/10 1730, she declined AVS

## 2025-04-01 DIAGNOSIS — D50.9 IRON DEFICIENCY ANEMIA, UNSPECIFIED IRON DEFICIENCY ANEMIA TYPE: Primary | ICD-10-CM

## 2025-04-04 ENCOUNTER — OFFICE VISIT (OUTPATIENT)
Dept: GYNECOLOGY | Facility: CLINIC | Age: 55
End: 2025-04-04
Payer: COMMERCIAL

## 2025-04-04 ENCOUNTER — APPOINTMENT (OUTPATIENT)
Dept: LAB | Facility: MEDICAL CENTER | Age: 55
End: 2025-04-04
Payer: COMMERCIAL

## 2025-04-04 VITALS
BODY MASS INDEX: 25.77 KG/M2 | SYSTOLIC BLOOD PRESSURE: 110 MMHG | WEIGHT: 174 LBS | DIASTOLIC BLOOD PRESSURE: 60 MMHG | HEIGHT: 69 IN

## 2025-04-04 DIAGNOSIS — D50.9 IRON DEFICIENCY ANEMIA, UNSPECIFIED IRON DEFICIENCY ANEMIA TYPE: ICD-10-CM

## 2025-04-04 DIAGNOSIS — Z78.0 MENOPAUSE: Primary | ICD-10-CM

## 2025-04-04 LAB
BASOPHILS # BLD AUTO: 0.06 THOUSANDS/ÂΜL (ref 0–0.1)
BASOPHILS NFR BLD AUTO: 1 % (ref 0–1)
EOSINOPHIL # BLD AUTO: 0.13 THOUSAND/ÂΜL (ref 0–0.61)
EOSINOPHIL NFR BLD AUTO: 2 % (ref 0–6)
ERYTHROCYTE [DISTWIDTH] IN BLOOD BY AUTOMATED COUNT: 18.6 % (ref 11.6–15.1)
FERRITIN SERPL-MCNC: 13 NG/ML (ref 11–307)
FOLATE SERPL-MCNC: >22.3 NG/ML
HCT VFR BLD AUTO: 48.1 % (ref 34.8–46.1)
HGB BLD-MCNC: 15.1 G/DL (ref 11.5–15.4)
IMM GRANULOCYTES # BLD AUTO: 0.01 THOUSAND/UL (ref 0–0.2)
IMM GRANULOCYTES NFR BLD AUTO: 0 % (ref 0–2)
IRON SATN MFR SERPL: 16 % (ref 15–50)
IRON SERPL-MCNC: 65 UG/DL (ref 50–212)
LYMPHOCYTES # BLD AUTO: 1.87 THOUSANDS/ÂΜL (ref 0.6–4.47)
LYMPHOCYTES NFR BLD AUTO: 33 % (ref 14–44)
MCH RBC QN AUTO: 26.6 PG (ref 26.8–34.3)
MCHC RBC AUTO-ENTMCNC: 31.4 G/DL (ref 31.4–37.4)
MCV RBC AUTO: 85 FL (ref 82–98)
MONOCYTES # BLD AUTO: 0.48 THOUSAND/ÂΜL (ref 0.17–1.22)
MONOCYTES NFR BLD AUTO: 8 % (ref 4–12)
NEUTROPHILS # BLD AUTO: 3.21 THOUSANDS/ÂΜL (ref 1.85–7.62)
NEUTS SEG NFR BLD AUTO: 56 % (ref 43–75)
NRBC BLD AUTO-RTO: 0 /100 WBCS
PLATELET # BLD AUTO: 289 THOUSANDS/UL (ref 149–390)
PMV BLD AUTO: 10.8 FL (ref 8.9–12.7)
RBC # BLD AUTO: 5.67 MILLION/UL (ref 3.81–5.12)
TIBC SERPL-MCNC: 406 UG/DL (ref 250–450)
TRANSFERRIN SERPL-MCNC: 290 MG/DL (ref 203–362)
UIBC SERPL-MCNC: 341 UG/DL (ref 155–355)
VIT B12 SERPL-MCNC: 163 PG/ML (ref 180–914)
WBC # BLD AUTO: 5.76 THOUSAND/UL (ref 4.31–10.16)

## 2025-04-04 PROCEDURE — 83540 ASSAY OF IRON: CPT

## 2025-04-04 PROCEDURE — 82728 ASSAY OF FERRITIN: CPT

## 2025-04-04 PROCEDURE — 99214 OFFICE O/P EST MOD 30 MIN: CPT | Performed by: OBSTETRICS & GYNECOLOGY

## 2025-04-04 PROCEDURE — 83550 IRON BINDING TEST: CPT

## 2025-04-04 PROCEDURE — 85025 COMPLETE CBC W/AUTO DIFF WBC: CPT

## 2025-04-04 PROCEDURE — 82607 VITAMIN B-12: CPT

## 2025-04-04 PROCEDURE — 82746 ASSAY OF FOLIC ACID SERUM: CPT

## 2025-04-04 RX ORDER — ESTRADIOL 0.05 MG/D
1 PATCH, EXTENDED RELEASE TRANSDERMAL 2 TIMES WEEKLY
Qty: 24 PATCH | Refills: 0 | Status: SHIPPED | OUTPATIENT
Start: 2025-04-07

## 2025-04-04 RX ORDER — TOPIRAMATE 50 MG/1
50 TABLET, FILM COATED ORAL EVERY 12 HOURS SCHEDULED
COMMUNITY

## 2025-04-04 RX ORDER — PROGESTERONE 100 MG/1
100 CAPSULE ORAL
Qty: 90 CAPSULE | Refills: 0 | Status: SHIPPED | OUTPATIENT
Start: 2025-04-04

## 2025-04-04 NOTE — PROGRESS NOTES
Name: Jessica Amaya      : 1970      MRN: [id number]  Encounter Provider: JAMAICA Marquez  Encounter Date: 2025   Encounter department: [facility]  :  Assessment & Plan  Menopause    Menopause discussed at length. Treatment options reviewed. Pt does not have contraindications to hormones and would like to start.   [facility] research/risks of blood clot, stroke, breast cancer, heart attack reviewed.   Will start estradiol patch twice weekly and oral progesterone 100 mg every night. Pt advised on importance of taking progesterone consistently to prevent uterine cancer.   Will RTO in 3 months for menopause follow up.   Orders:    estradiol (Vivelle-Dot) 0.05 MG/24HR; Place 1 patch on the skin 2 (two) times a week    Progesterone 100 MG CAPS; Take 100 mg by mouth daily at bedtime        History of Present Illness   Pt presents for menopause consult.   FMP was a year and half   Most bothersome - sleep disturbance. Goes to bed at 9 pm and goes to sleep, night sweats waking her up every hour. Resulting fatigue  Hot flashes - 4-5 daily  Frozen shoulder - in PT since December.   Brain fog started 6 months ago, forgetful. Forgetting peoples names. 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   Sexual health - 6 years with partner, initiates sex once a week, no pain with intercourse.   Facial hair     Mammo/colonoscopy/pap UTD  Lipids normal          Review of Systems   Constitutional:  Positive for fatigue.   Respiratory: Negative.     Cardiovascular: Negative.    Gastrointestinal: Negative.    Endocrine: Positive for heat intolerance.   Genitourinary:  Negative for dyspareunia, dysuria, frequency, pelvic pain, urgency, vaginal bleeding, vaginal discharge and vaginal pain.   Musculoskeletal: Negative.    Skin: Negative.    Neurological: Negative.    Psychiatric/Behavioral:  Positive for decreased concentration and sleep disturbance.           Objective   BP  "110/60 (BP Location: Left arm, Patient Position: Sitting, Cuff Size: Standard)   Ht 5' 9\" (1.753 m)   Wt 78.9 kg (174 lb)   LMP 02/02/2022 (Approximate)   BMI 25.70 kg/m²      Physical Exam  Vitals and nursing note reviewed.   Constitutional:       Appearance: Normal appearance.   HENT:      Head: Normocephalic and atraumatic.   Pulmonary:      Effort: Pulmonary effort is normal.   Musculoskeletal:         General: Normal range of motion.      Cervical back: Normal range of motion.   Skin:     General: Skin is warm and dry.   Neurological:      Mental Status: She is alert and oriented to person, place, and time.   Psychiatric:         Mood and Affect: Mood normal.         Behavior: Behavior normal.         Thought Content: Thought content normal.         Judgment: Judgment normal.         Administrative Statements   I have spent a total time of 35  minutes in caring for this patient on the day of the visit/encounter including Diagnostic results, Prognosis, Risks and benefits of tx options, Instructions for management, Patient and family education, Importance of tx compliance, Risk factor reductions, Impressions, Counseling / Coordination of care, Documenting in the medical record, Reviewing/placing orders in the medical record (including tests, medications, and/or procedures), and Obtaining or reviewing history  .  "

## 2025-04-08 ENCOUNTER — TELEPHONE (OUTPATIENT)
Dept: HEMATOLOGY ONCOLOGY | Facility: CLINIC | Age: 55
End: 2025-04-08

## 2025-04-08 ENCOUNTER — OFFICE VISIT (OUTPATIENT)
Dept: HEMATOLOGY ONCOLOGY | Facility: CLINIC | Age: 55
End: 2025-04-08
Payer: COMMERCIAL

## 2025-04-08 VITALS
HEART RATE: 78 BPM | BODY MASS INDEX: 25.74 KG/M2 | OXYGEN SATURATION: 100 % | DIASTOLIC BLOOD PRESSURE: 80 MMHG | RESPIRATION RATE: 17 BRPM | HEIGHT: 69 IN | SYSTOLIC BLOOD PRESSURE: 118 MMHG | WEIGHT: 173.8 LBS | TEMPERATURE: 97.4 F

## 2025-04-08 DIAGNOSIS — E53.8 VITAMIN B12 DEFICIENCY: ICD-10-CM

## 2025-04-08 DIAGNOSIS — E61.1 IRON DEFICIENCY: Primary | ICD-10-CM

## 2025-04-08 DIAGNOSIS — E53.8 FOLATE DEFICIENCY: ICD-10-CM

## 2025-04-08 PROCEDURE — 99214 OFFICE O/P EST MOD 30 MIN: CPT

## 2025-04-08 RX ORDER — SODIUM CHLORIDE 9 MG/ML
20 INJECTION, SOLUTION INTRAVENOUS ONCE
OUTPATIENT
Start: 2025-04-22

## 2025-04-08 RX ORDER — CYANOCOBALAMIN 1000 UG/ML
1000 INJECTION, SOLUTION INTRAMUSCULAR; SUBCUTANEOUS ONCE
OUTPATIENT
Start: 2025-04-22 | End: 2025-04-22

## 2025-04-08 NOTE — PROGRESS NOTES
Name: Jessica Amaya      : 1970      MRN: [id number]  Encounter Provider: JAMAICA Leal  Encounter Date: 2025   Encounter department: 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  Recommend instead of taking a regular multivitamin, patient get a bariatric multivitamin with 45 mg of iron.  We reviewed options on [employer].      In addition, she will likely benefit from another round of IV iron treatment, Veonfer 300 mg x weekly 4 doses with IM B12 injections x 4 weekly doses.  She tolerated these infusions previously without incident.  She is scheduled for a B12 injection on 4/10/2025, recommend she get this injection.  Then the monthly injections will be on hold until she completes her weekly doses.  She will resume a month after the last infusion.      We will see patient back in the office in 4 months with labs prior (CBC, Iron panel, vitamin B12 and folate).  Patient is agreeable with plan.  Patient aware to contact us for worsening symptoms or for additional questions/concerns.      Return in about 4 months (around 2025).    History of Present Illness   Chief Complaint   Patient presents with    Follow-up     Pertinent Medical History     25: Jessica Amaya is a 54-year old female who presents for a follow up of her iron deficiency anemia.  She was last seen in the office on 12/3/2024 by Dr. Aquino.  Patient has a PMH significant for Jonathon-en-Y surgery colon polyp, GERD.      She is status post IV iron treatment, Venfoer 300 mg (2024 to 2025).  She is currently taking a multivitamin and is setup for monthly vitamin B12 injections.   "    Patient continues to experience increased fatigue, she is not sleeping and attributes it to menopause and hot flashes that are keeping her awake.  She just initiated hormonal therapy.  She is also experiencing brain fog, vertigo like symptoms and RLS.  Denies headaches, PICA, CP, SOB or palpitations.  Patient denies abnormal bleeding: epistaxis, gingival bleeding, hematuria, dark tarry stools.    Labs:  4/4/2025: Hgb 15.1, HCT 48.1%, WBC 5.76, platelets 289,000, serum iron 65, iron saturation 16%, ferritin 13, vitamin B12 163, folate >22.3    12/5/2024: serum iron 15, iron saturation 2%, ferritin 3    Patient is up to date on her age appropriate cancer screenings.       Review of Systems   Constitutional:  Positive for fatigue. Negative for activity change, appetite change, diaphoresis, fever and unexpected weight change.   HENT:  Negative for nosebleeds.    Eyes: Negative.    Respiratory:  Negative for cough, chest tightness and shortness of breath.    Cardiovascular:  Negative for chest pain, palpitations and leg swelling.   Gastrointestinal:  Negative for abdominal pain and blood in stool.   Endocrine: Negative.    Genitourinary:  Negative for hematuria.   Musculoskeletal: Negative.    Skin: Negative.    Neurological:  Positive for dizziness and light-headedness. Negative for headaches.   Hematological: Negative.            Objective   /80 (BP Location: Left arm, Patient Position: Sitting, Cuff Size: Adult)   Pulse 78   Temp (!) 97.4 °F (36.3 °C) (Temporal)   Resp 17   Ht 5' 9\" (1.753 m)   Wt 78.8 kg (173 lb 12.8 oz)   LMP 02/02/2022 (Approximate)   SpO2 100%   BMI 25.67 kg/m²     Physical Exam  Constitutional:       Appearance: Normal appearance.   HENT:      Head: Normocephalic and atraumatic.   Cardiovascular:      Rate and Rhythm: Regular rhythm.      Heart sounds: Normal heart sounds.   Pulmonary:      Breath sounds: Normal breath sounds.   Musculoskeletal:      Cervical back: Normal range " of motion.      Right lower leg: No edema.      Left lower leg: No edema.   Skin:     General: Skin is warm and dry.   Neurological:      Mental Status: She is alert and oriented to person, place, and time.   Psychiatric:         Mood and Affect: Mood normal.         Behavior: Behavior normal.         Thought Content: Thought content normal.         Judgment: Judgment normal.         Labs: I have reviewed the following labs:  Results for orders placed or performed in visit on 04/04/25   CBC and differential   Result Value Ref Range    WBC 5.76 4.31 - 10.16 Thousand/uL    RBC 5.67 (H) 3.81 - 5.12 Million/uL    Hemoglobin 15.1 11.5 - 15.4 g/dL    Hematocrit 48.1 (H) 34.8 - 46.1 %    MCV 85 82 - 98 fL    MCH 26.6 (L) 26.8 - 34.3 pg    MCHC 31.4 31.4 - 37.4 g/dL    RDW 18.6 (H) 11.6 - 15.1 %    MPV 10.8 8.9 - 12.7 fL    Platelets 289 149 - 390 Thousands/uL    nRBC 0 /100 WBCs    Segmented % 56 43 - 75 %    Immature Grans % 0 0 - 2 %    Lymphocytes % 33 14 - 44 %    Monocytes % 8 4 - 12 %    Eosinophils Relative 2 0 - 6 %    Basophils Relative 1 0 - 1 %    Absolute Neutrophils 3.21 1.85 - 7.62 Thousands/µL    Absolute Immature Grans 0.01 0.00 - 0.20 Thousand/uL    Absolute Lymphocytes 1.87 0.60 - 4.47 Thousands/µL    Absolute Monocytes 0.48 0.17 - 1.22 Thousand/µL    Eosinophils Absolute 0.13 0.00 - 0.61 Thousand/µL    Basophils Absolute 0.06 0.00 - 0.10 Thousands/µL   Vitamin B12   Result Value Ref Range    Vitamin B-12 163 (L) 180 - 914 pg/mL   Result Value Ref Range    Folate >22.3 >5.9 ng/mL   I spent 30 minutes in chart review, counseling, coordination of care, and documentation.    This note has been generated by voice recognition software system.  Therefore, there may be spelling, grammar, and or syntax errors. Please contact if questions arise.

## 2025-04-10 ENCOUNTER — HOSPITAL ENCOUNTER (OUTPATIENT)
Dept: INFUSION CENTER | Facility: CLINIC | Age: 55
Discharge: HOME | End: 2025-04-10
Payer: COMMERCIAL

## 2025-04-10 DIAGNOSIS — E53.8 VITAMIN B12 DEFICIENCY: Primary | ICD-10-CM

## 2025-04-10 DIAGNOSIS — Z90.3 POSTGASTRECTOMY MALABSORPTION: ICD-10-CM

## 2025-04-10 DIAGNOSIS — K91.2 POSTGASTRECTOMY MALABSORPTION: ICD-10-CM

## 2025-04-10 PROCEDURE — 96372 THER/PROPH/DIAG INJ SC/IM: CPT

## 2025-04-10 RX ORDER — CYANOCOBALAMIN 1000 UG/ML
1000 INJECTION, SOLUTION INTRAMUSCULAR; SUBCUTANEOUS ONCE
Status: COMPLETED | OUTPATIENT
Start: 2025-04-10 | End: 2025-04-10

## 2025-04-10 RX ORDER — CYANOCOBALAMIN 1000 UG/ML
1000 INJECTION, SOLUTION INTRAMUSCULAR; SUBCUTANEOUS ONCE
OUTPATIENT
Start: 2025-05-08

## 2025-04-10 RX ADMIN — CYANOCOBALAMIN 1000 MCG: 1000 INJECTION, SOLUTION INTRAMUSCULAR at 16:40

## 2025-04-10 NOTE — PROGRESS NOTES
Patient to Infusion Center for Vitamin B12: Offers no complaints at present time: Lab work ( 04/04/25 ) reviewed: Vitamin B12 - 163: Within parameters to treat: Injection given in Left UA without incident: No adverse reactions noted: Verified follow up appt with patient ( 04/24/25 ): AVS offered and declined

## 2025-04-24 ENCOUNTER — HOSPITAL ENCOUNTER (OUTPATIENT)
Dept: INFUSION CENTER | Facility: CLINIC | Age: 55
Discharge: HOME | End: 2025-04-24
Attending: INTERNAL MEDICINE
Payer: COMMERCIAL

## 2025-04-24 VITALS
SYSTOLIC BLOOD PRESSURE: 118 MMHG | DIASTOLIC BLOOD PRESSURE: 72 MMHG | HEART RATE: 62 BPM | OXYGEN SATURATION: 98 % | TEMPERATURE: 97.2 F | RESPIRATION RATE: 18 BRPM

## 2025-04-24 DIAGNOSIS — E53.8 VITAMIN B12 DEFICIENCY: Primary | ICD-10-CM

## 2025-04-24 DIAGNOSIS — E61.1 IRON DEFICIENCY: ICD-10-CM

## 2025-04-24 PROCEDURE — 96372 THER/PROPH/DIAG INJ SC/IM: CPT

## 2025-04-24 PROCEDURE — 96365 THER/PROPH/DIAG IV INF INIT: CPT

## 2025-04-24 RX ORDER — SODIUM CHLORIDE 9 MG/ML
20 INJECTION, SOLUTION INTRAVENOUS ONCE
Status: CANCELLED | OUTPATIENT
Start: 2025-05-02

## 2025-04-24 RX ORDER — CYANOCOBALAMIN 1000 UG/ML
1000 INJECTION, SOLUTION INTRAMUSCULAR; SUBCUTANEOUS ONCE
Status: CANCELLED | OUTPATIENT
Start: 2025-05-02 | End: 2025-05-01

## 2025-04-24 RX ORDER — CYANOCOBALAMIN 1000 UG/ML
1000 INJECTION, SOLUTION INTRAMUSCULAR; SUBCUTANEOUS ONCE
Status: COMPLETED | OUTPATIENT
Start: 2025-04-24 | End: 2025-04-24

## 2025-04-24 RX ORDER — SODIUM CHLORIDE 9 MG/ML
20 INJECTION, SOLUTION INTRAVENOUS ONCE
Status: COMPLETED | OUTPATIENT
Start: 2025-04-24 | End: 2025-04-24

## 2025-04-24 RX ADMIN — CYANOCOBALAMIN 1000 MCG: 1000 INJECTION, SOLUTION INTRAMUSCULAR at 08:17

## 2025-04-24 RX ADMIN — SODIUM CHLORIDE 20 ML/HR: 0.9 INJECTION, SOLUTION INTRAVENOUS at 08:12

## 2025-04-24 RX ADMIN — IRON SUCROSE 300 MG: 20 INJECTION, SOLUTION INTRAVENOUS at 08:16

## 2025-04-24 NOTE — PROGRESS NOTES
Pt. tolerated Venofer infusion without incident, AVS declined.  Next appt confirmed for 5/2 @ 7:30.

## 2025-05-02 ENCOUNTER — HOSPITAL ENCOUNTER (OUTPATIENT)
Dept: INFUSION CENTER | Facility: CLINIC | Age: 55
End: 2025-05-02
Attending: INTERNAL MEDICINE
Payer: COMMERCIAL

## 2025-05-02 VITALS
RESPIRATION RATE: 18 BRPM | HEART RATE: 60 BPM | SYSTOLIC BLOOD PRESSURE: 108 MMHG | DIASTOLIC BLOOD PRESSURE: 74 MMHG | TEMPERATURE: 97 F

## 2025-05-02 DIAGNOSIS — E61.1 IRON DEFICIENCY: ICD-10-CM

## 2025-05-02 DIAGNOSIS — E53.8 VITAMIN B12 DEFICIENCY: Primary | ICD-10-CM

## 2025-05-02 PROCEDURE — 96365 THER/PROPH/DIAG IV INF INIT: CPT

## 2025-05-02 PROCEDURE — 96372 THER/PROPH/DIAG INJ SC/IM: CPT

## 2025-05-02 RX ORDER — SODIUM CHLORIDE 9 MG/ML
20 INJECTION, SOLUTION INTRAVENOUS ONCE
Status: COMPLETED | OUTPATIENT
Start: 2025-05-02 | End: 2025-05-02

## 2025-05-02 RX ORDER — SODIUM CHLORIDE 9 MG/ML
20 INJECTION, SOLUTION INTRAVENOUS ONCE
OUTPATIENT
Start: 2025-05-08

## 2025-05-02 RX ORDER — CYANOCOBALAMIN 1000 UG/ML
1000 INJECTION, SOLUTION INTRAMUSCULAR; SUBCUTANEOUS ONCE
Status: COMPLETED | OUTPATIENT
Start: 2025-05-02 | End: 2025-05-02

## 2025-05-02 RX ORDER — CYANOCOBALAMIN 1000 UG/ML
1000 INJECTION, SOLUTION INTRAMUSCULAR; SUBCUTANEOUS ONCE
OUTPATIENT
Start: 2025-05-08 | End: 2025-05-08

## 2025-05-02 RX ADMIN — CYANOCOBALAMIN 1000 MCG: 1000 INJECTION, SOLUTION INTRAMUSCULAR at 07:44

## 2025-05-02 RX ADMIN — SODIUM CHLORIDE 20 ML/HR: 9 INJECTION, SOLUTION INTRAVENOUS at 07:43

## 2025-05-02 RX ADMIN — IRON SUCROSE 300 MG: 20 INJECTION, SOLUTION INTRAVENOUS at 07:42

## 2025-05-02 NOTE — PROGRESS NOTES
Pt here for venofer and B12, injection given in L arm. Pt tolerated well with no complaints. Next appt 5/8 at 0730 at [facility]. Declined AVS.

## 2025-05-05 RX ORDER — CYANOCOBALAMIN 1000 UG/ML
1000 INJECTION, SOLUTION INTRAMUSCULAR; SUBCUTANEOUS ONCE
OUTPATIENT
Start: 2025-05-08

## 2025-05-08 ENCOUNTER — HOSPITAL ENCOUNTER (OUTPATIENT)
Dept: INFUSION CENTER | Facility: CLINIC | Age: 55
Discharge: HOME | End: 2025-05-08
Attending: INTERNAL MEDICINE
Payer: COMMERCIAL

## 2025-05-08 VITALS
TEMPERATURE: 97.1 F | DIASTOLIC BLOOD PRESSURE: 73 MMHG | OXYGEN SATURATION: 97 % | RESPIRATION RATE: 18 BRPM | HEART RATE: 64 BPM | SYSTOLIC BLOOD PRESSURE: 109 MMHG

## 2025-05-08 DIAGNOSIS — Z90.3 POSTGASTRECTOMY MALABSORPTION: ICD-10-CM

## 2025-05-08 DIAGNOSIS — K91.2 POSTGASTRECTOMY MALABSORPTION: ICD-10-CM

## 2025-05-08 DIAGNOSIS — E61.1 IRON DEFICIENCY: Primary | ICD-10-CM

## 2025-05-08 DIAGNOSIS — E53.8 VITAMIN B12 DEFICIENCY: ICD-10-CM

## 2025-05-08 PROCEDURE — 96365 THER/PROPH/DIAG IV INF INIT: CPT

## 2025-05-08 PROCEDURE — 96372 THER/PROPH/DIAG INJ SC/IM: CPT

## 2025-05-08 RX ORDER — SODIUM CHLORIDE 9 MG/ML
20 INJECTION, SOLUTION INTRAVENOUS ONCE
Status: CANCELLED | OUTPATIENT
Start: 2025-05-16

## 2025-05-08 RX ORDER — CYANOCOBALAMIN 1000 UG/ML
1000 INJECTION, SOLUTION INTRAMUSCULAR; SUBCUTANEOUS ONCE
Status: CANCELLED | OUTPATIENT
Start: 2025-05-16 | End: 2025-05-09

## 2025-05-08 RX ORDER — SODIUM CHLORIDE 9 MG/ML
20 INJECTION, SOLUTION INTRAVENOUS ONCE
Status: COMPLETED | OUTPATIENT
Start: 2025-05-08 | End: 2025-05-08

## 2025-05-08 RX ORDER — CYANOCOBALAMIN 1000 UG/ML
1000 INJECTION, SOLUTION INTRAMUSCULAR; SUBCUTANEOUS ONCE
Status: COMPLETED | OUTPATIENT
Start: 2025-05-08 | End: 2025-05-08

## 2025-05-08 RX ADMIN — CYANOCOBALAMIN 1000 MCG: 1000 INJECTION, SOLUTION INTRAMUSCULAR at 09:20

## 2025-05-08 RX ADMIN — IRON SUCROSE 300 MG: 20 INJECTION, SOLUTION INTRAVENOUS at 07:45

## 2025-05-08 RX ADMIN — SODIUM CHLORIDE 20 ML/HR: 0.9 INJECTION, SOLUTION INTRAVENOUS at 07:35

## 2025-05-08 NOTE — PROGRESS NOTES
Patient to Infusion Center for Venofer / Vitamin B12: Complaining of ongoing arm pain: MD aware: Lab work ( 04/04/25 ) reviewed: Iron - 65: Ferritin - 13: Within parameters to treat: Left AC PIV initiated without incident

## 2025-05-08 NOTE — PROGRESS NOTES
Tolerated infusion without incident: No adverse reactions noted: Vitamin B12 per MD order: Injections given in Left UA: Verified follow up appt with patient ( 05/16/25 ): AVS offered and declined

## 2025-05-12 ENCOUNTER — APPOINTMENT (OUTPATIENT)
Dept: URGENT CARE | Facility: MEDICAL CENTER | Age: 55
End: 2025-05-12

## 2025-05-16 ENCOUNTER — HOSPITAL ENCOUNTER (OUTPATIENT)
Dept: INFUSION CENTER | Facility: CLINIC | Age: 55
End: 2025-05-16
Attending: INTERNAL MEDICINE
Payer: COMMERCIAL

## 2025-05-16 VITALS
SYSTOLIC BLOOD PRESSURE: 112 MMHG | OXYGEN SATURATION: 99 % | TEMPERATURE: 96.9 F | DIASTOLIC BLOOD PRESSURE: 73 MMHG | RESPIRATION RATE: 18 BRPM | HEART RATE: 71 BPM

## 2025-05-16 DIAGNOSIS — E61.1 IRON DEFICIENCY: ICD-10-CM

## 2025-05-16 DIAGNOSIS — E53.8 VITAMIN B12 DEFICIENCY: Primary | ICD-10-CM

## 2025-05-16 RX ORDER — SODIUM CHLORIDE 9 MG/ML
20 INJECTION, SOLUTION INTRAVENOUS ONCE
Status: CANCELLED | OUTPATIENT
Start: 2025-05-22

## 2025-05-16 RX ORDER — SODIUM CHLORIDE 9 MG/ML
20 INJECTION, SOLUTION INTRAVENOUS ONCE
Status: COMPLETED | OUTPATIENT
Start: 2025-05-16 | End: 2025-05-16

## 2025-05-16 RX ORDER — CYANOCOBALAMIN 1000 UG/ML
1000 INJECTION, SOLUTION INTRAMUSCULAR; SUBCUTANEOUS ONCE
Status: COMPLETED | OUTPATIENT
Start: 2025-05-16 | End: 2025-05-16

## 2025-05-16 RX ORDER — CYANOCOBALAMIN 1000 UG/ML
1000 INJECTION, SOLUTION INTRAMUSCULAR; SUBCUTANEOUS ONCE
Status: CANCELLED | OUTPATIENT
Start: 2025-05-22 | End: 2025-05-22

## 2025-05-16 RX ADMIN — IRON SUCROSE 300 MG: 20 INJECTION, SOLUTION INTRAVENOUS at 07:43

## 2025-05-16 RX ADMIN — CYANOCOBALAMIN 1000 MCG: 1000 INJECTION, SOLUTION INTRAMUSCULAR at 07:45

## 2025-05-16 RX ADMIN — SODIUM CHLORIDE 20 ML/HR: 0.9 INJECTION, SOLUTION INTRAVENOUS at 07:43

## 2025-05-16 NOTE — PROGRESS NOTES
Pt to clinic for venofer infusion and B12 injection. Pt tolerated without complications. Pt will call to schedule next appointment.

## 2025-05-16 NOTE — PLAN OF CARE
Problem: Potential for Falls  Goal: Patient will remain free of falls  Description: INTERVENTIONS:  - Educate patient/family on patient safety including physical limitations  - Instruct patient to call for assistance with activity   - Consider consulting OT/PT to assist with strengthening/mobility based on AM PAC & JH-HLM score  - Consult OT/PT to assist with strengthening/mobility   - Keep Call bell within reach  - Keep bed low and locked with side rails adjusted as appropriate  - Keep care items and personal belongings within reach  - Initiate and maintain comfort rounds  - Make Fall Risk Sign visible to staff  -Outcome: Completed     Problem: Knowledge Deficit  Goal: Patient/family/caregiver demonstrates understanding of disease process, treatment plan, medications, and discharge instructions  Description: Complete learning assessment and assess knowledge base.  Interventions:  - Provide teaching at level of understanding  - Provide teaching via preferred learning methods  Outcome: Completed

## 2025-06-23 DIAGNOSIS — Z12.31 SCREENING MAMMOGRAM FOR BREAST CANCER: Primary | ICD-10-CM

## 2025-06-27 DIAGNOSIS — Z78.0 MENOPAUSE: ICD-10-CM

## 2025-06-27 RX ORDER — PROGESTERONE 100 MG/1
100 CAPSULE ORAL
Qty: 90 CAPSULE | Refills: 0 | Status: SHIPPED | OUTPATIENT
Start: 2025-06-27

## 2025-07-07 ENCOUNTER — OFFICE VISIT (OUTPATIENT)
Dept: GYNECOLOGY | Facility: CLINIC | Age: 55
End: 2025-07-07
Payer: COMMERCIAL

## 2025-07-07 ENCOUNTER — ANNUAL EXAM (OUTPATIENT)
Dept: OBGYN CLINIC | Facility: CLINIC | Age: 55
End: 2025-07-07
Payer: COMMERCIAL

## 2025-07-07 VITALS — WEIGHT: 170 LBS | SYSTOLIC BLOOD PRESSURE: 108 MMHG | BODY MASS INDEX: 25.1 KG/M2 | DIASTOLIC BLOOD PRESSURE: 70 MMHG

## 2025-07-07 VITALS — SYSTOLIC BLOOD PRESSURE: 112 MMHG | DIASTOLIC BLOOD PRESSURE: 66 MMHG

## 2025-07-07 DIAGNOSIS — Z01.419 WOMEN'S ANNUAL ROUTINE GYNECOLOGICAL EXAMINATION: Primary | ICD-10-CM

## 2025-07-07 DIAGNOSIS — Z78.0 MENOPAUSE: ICD-10-CM

## 2025-07-07 PROCEDURE — S0612 ANNUAL GYNECOLOGICAL EXAMINA: HCPCS | Performed by: OBSTETRICS & GYNECOLOGY

## 2025-07-07 PROCEDURE — 99213 OFFICE O/P EST LOW 20 MIN: CPT | Performed by: OBSTETRICS & GYNECOLOGY

## 2025-07-07 RX ORDER — ESTRADIOL 0.05 MG/D
1 PATCH, EXTENDED RELEASE TRANSDERMAL 2 TIMES WEEKLY
Qty: 24 PATCH | Refills: 3 | Status: SHIPPED | OUTPATIENT
Start: 2025-07-07

## 2025-07-07 RX ORDER — PROGESTERONE 100 MG/1
100 CAPSULE ORAL
Qty: 90 CAPSULE | Refills: 1 | Status: SHIPPED | OUTPATIENT
Start: 2025-07-07

## 2025-07-07 NOTE — PROGRESS NOTES
"Name: Jessica Amaya      : 1970      MRN: [id number]  Encounter Provider: JAMAICA Marquez  Encounter Date: 2025   Encounter department: [facility]  :  Assessment & Plan  Menopause  Discussed removing estrogen patch for flight to [place], wearing compression stockings, taking low dose ASA to prevent blood clots. [facility] risks of blood clot, stroke, heart attack and breast cancer reviewed once again. She would like to continue with estradiol patch twice weekly and oral progesterone every night. Will continue preventative gyn care with Dr. Murphy, but prefers to RTO in 1 year for menopause follow up or sooner with any concerns.   Orders:    estradiol (Vivelle-Dot) 0.05 MG/24HR; Place 1 patch on the skin 2 (two) times a week    Progesterone 100 MG CAPS; Take 100 mg by mouth daily at bedtime        History of Present Illness   Pt presents for 3 month menopause follow up.   She was last seen 25 - see note.   Last mammo 2024. Pt will be scheduling when she returns from trip to [place].  She was started on estradiol patch 0.05 mg twice weekly and oral progesterone 100 mg daily at HS.   Today she states she is \"so much better\" and is very pleased. She no longer has hot flashes. She is sleeping much better which has improved fatigue and brain fog. She would like to continue.           Review of Systems   Constitutional: Negative.    Respiratory: Negative.     Cardiovascular: Negative.    Gastrointestinal: Negative.    Endocrine: Negative.    Genitourinary:  Negative for dyspareunia, dysuria, frequency, pelvic pain, urgency, vaginal bleeding, vaginal discharge and vaginal pain.   Musculoskeletal: Negative.    Skin: Negative.    Neurological: Negative.    Psychiatric/Behavioral: Negative.            Objective   /66 (BP Location: Right arm, Patient Position: Sitting, Cuff Size: Standard)   LMP 2022 (Approximate)      Physical Exam  Vitals and " nursing note reviewed.   Constitutional:       Appearance: Normal appearance.   HENT:      Head: Normocephalic and atraumatic.   Pulmonary:      Effort: Pulmonary effort is normal.     Musculoskeletal:         General: Normal range of motion.      Cervical back: Normal range of motion.     Skin:     General: Skin is warm and dry.     Neurological:      Mental Status: She is alert and oriented to person, place, and time.     Psychiatric:         Mood and Affect: Mood normal.         Behavior: Behavior normal.         Thought Content: Thought content normal.         Judgment: Judgment normal.

## 2025-07-07 NOTE — PROGRESS NOTES
Name: Jessica Amaya      : 1970      MRN: [id number]  Encounter Provider: Joshua Murphy MD  Encounter Date: 2025   Encounter department: [facility]  :  Assessment & Plan    The patient was informed of a stable G menopausal GYN examination.  She is doing well with [facility].  She should continue it.  She will continue current colonoscopies as directed she will continue getting her yearly mammograms.  She will watch her for any vaginal bleeding or spotting return to my office in 1 year.    History of Present Illness   HPI  Jessica Amaya is a 55 y.o. female who presents for annual GYN examination.  She is a  4 para 2, with 1 vaginal delivery and 1  section.  She is menopausal.  She has been evaluated by one of the GYN team and was started on hormone replacement therapy for menopausal symptoms she is currently on a Vivelle-Dot patch and progesterone she feels much better.  Her quality of life her sexuality and what she feels was much improved.  She is aware to watch her for any vaginal bleeding or spotting let us know.  She is content with her weight.  She feels safe at home.  She is a dentist on a regular basis.  She is currently being evaluated for frozen shoulder.  She has stable relationship no Pap smear today.  Her vital signs are reviewed her weight is stable.  There are no new major family illnesses to report.  No problem with depression or anxiety, her PHQ score today is a total of 3 but feels that has to do more through her anemia and frozen shoulder.  She is feeling better.  Denies any prior depression.      Review of Systems   All other systems reviewed and are negative.         Objective   /70   Wt 77.1 kg (170 lb)   LMP 2022 (Approximate)   BMI 25.10 kg/m²      Physical Exam  Vitals reviewed. Exam conducted with a chaperone present.   Constitutional:       Appearance: Normal appearance. She is normal weight.   HENT:       Head: Normocephalic and atraumatic.      Mouth/Throat:      Mouth: Mucous membranes are moist.     Eyes:      Extraocular Movements: Extraocular movements intact.      Pupils: Pupils are equal, round, and reactive to light.       Cardiovascular:      Rate and Rhythm: Normal rate and regular rhythm.      Pulses: Normal pulses.      Heart sounds: Normal heart sounds.   Pulmonary:      Effort: Pulmonary effort is normal.      Breath sounds: Normal breath sounds.   Chest:   Breasts:     Breasts are symmetrical.      Right: Normal.      Left: Normal.   Abdominal:      General: Abdomen is flat. A surgical scar is present. There is no distension.      Palpations: Abdomen is soft. There is no hepatomegaly, splenomegaly or mass.      Tenderness: There is no abdominal tenderness.      Comments: Midline incisions from her  and bariatric surgery well-healed.   Genitourinary:     General: Normal vulva.      Pubic Area: No rash or pubic lice.       Labia:         Right: No rash, tenderness, lesion or injury.         Left: No rash, tenderness, lesion or injury.       Vagina: Normal. No signs of injury and foreign body. No vaginal discharge, erythema, tenderness, bleeding, lesions or prolapsed vaginal walls.      Cervix: Normal.      Uterus: Normal. Not deviated, not enlarged, not fixed and not tender.       Rectum: Normal.      Comments: External genitalia normal limits the vagina is clean cervix is closed.  Uterus is anterior normal size there is no cervical motion tenderness.  There is good estrogen effect from HRT on the vagina vaginal mucosa.  There is no evidence of prolapse.    Musculoskeletal:         General: Normal range of motion.      Cervical back: Normal range of motion and neck supple.   Lymphadenopathy:      Upper Body:      Right upper body: No supraclavicular adenopathy.      Left upper body: No supraclavicular adenopathy.     Skin:     General: Skin is warm and dry.     Neurological:      General: No  focal deficit present.      Mental Status: She is alert and oriented to person, place, and time.     Psychiatric:         Mood and Affect: Mood normal.         Behavior: Behavior normal.         Thought Content: Thought content normal.

## 2025-07-07 NOTE — PATIENT INSTRUCTIONS
The patient was informed of a stable menopausal GYN examination.  She will continue her B12 infusions.  She will continue hormone replacement therapy.  She should return to my office in 1 year unless new issues occur.  If any vaginal bleeding or spotting she will call the immediately.